# Patient Record
Sex: FEMALE | HISPANIC OR LATINO | Employment: OTHER | URBAN - METROPOLITAN AREA
[De-identification: names, ages, dates, MRNs, and addresses within clinical notes are randomized per-mention and may not be internally consistent; named-entity substitution may affect disease eponyms.]

---

## 2023-06-18 ENCOUNTER — HOSPITAL ENCOUNTER (INPATIENT)
Facility: MEDICAL CENTER | Age: 83
LOS: 4 days | DRG: 378 | End: 2023-06-22
Attending: EMERGENCY MEDICINE | Admitting: STUDENT IN AN ORGANIZED HEALTH CARE EDUCATION/TRAINING PROGRAM
Payer: MEDICARE

## 2023-06-18 DIAGNOSIS — K92.1 GASTROINTESTINAL HEMORRHAGE WITH MELENA: ICD-10-CM

## 2023-06-18 DIAGNOSIS — D64.9 ANEMIA, UNSPECIFIED TYPE: ICD-10-CM

## 2023-06-18 LAB
ABO + RH BLD: NORMAL
ABO GROUP BLD: ABNORMAL
ALBUMIN SERPL BCP-MCNC: 3.5 G/DL (ref 3.2–4.9)
ALBUMIN/GLOB SERPL: 1.5 G/DL
ALP SERPL-CCNC: 64 U/L (ref 30–99)
ALT SERPL-CCNC: 10 U/L (ref 2–50)
ANION GAP SERPL CALC-SCNC: 13 MMOL/L (ref 7–16)
APTT PPP: 24 SEC (ref 24.7–36)
AST SERPL-CCNC: 21 U/L (ref 12–45)
BARCODED ABORH UBTYP: 5100
BARCODED ABORH UBTYP: 5100
BARCODED PRD CODE UBPRD: ABNORMAL
BARCODED PRD CODE UBPRD: ABNORMAL
BARCODED UNIT NUM UBUNT: ABNORMAL
BARCODED UNIT NUM UBUNT: ABNORMAL
BASOPHILS # BLD AUTO: 0.3 % (ref 0–1.8)
BASOPHILS # BLD: 0.03 K/UL (ref 0–0.12)
BILIRUB SERPL-MCNC: 0.3 MG/DL (ref 0.1–1.5)
BLD GP AB SCN SERPL QL: ABNORMAL
BUN SERPL-MCNC: 100 MG/DL (ref 8–22)
CALCIUM ALBUM COR SERPL-MCNC: 9.4 MG/DL (ref 8.5–10.5)
CALCIUM SERPL-MCNC: 9 MG/DL (ref 8.5–10.5)
CHLORIDE SERPL-SCNC: 99 MMOL/L (ref 96–112)
CO2 SERPL-SCNC: 20 MMOL/L (ref 20–33)
COMPONENT R 8504R: ABNORMAL
COMPONENT R 8504R: ABNORMAL
CREAT SERPL-MCNC: 1.95 MG/DL (ref 0.5–1.4)
EOSINOPHIL # BLD AUTO: 0.05 K/UL (ref 0–0.51)
EOSINOPHIL NFR BLD: 0.5 % (ref 0–6.9)
ERYTHROCYTE [DISTWIDTH] IN BLOOD BY AUTOMATED COUNT: 53.1 FL (ref 35.9–50)
GFR SERPLBLD CREATININE-BSD FMLA CKD-EPI: 25 ML/MIN/1.73 M 2
GLOBULIN SER CALC-MCNC: 2.3 G/DL (ref 1.9–3.5)
GLUCOSE SERPL-MCNC: 121 MG/DL (ref 65–99)
HCT VFR BLD AUTO: 21.6 % (ref 37–47)
HGB BLD-MCNC: 7 G/DL (ref 12–16)
IMM GRANULOCYTES # BLD AUTO: 0.12 K/UL (ref 0–0.11)
IMM GRANULOCYTES NFR BLD AUTO: 1.2 % (ref 0–0.9)
INR PPP: 1.19 (ref 0.87–1.13)
LYMPHOCYTES # BLD AUTO: 1.78 K/UL (ref 1–4.8)
LYMPHOCYTES NFR BLD: 17.1 % (ref 22–41)
MCH RBC QN AUTO: 31.4 PG (ref 27–33)
MCHC RBC AUTO-ENTMCNC: 32.4 G/DL (ref 32.2–35.5)
MCV RBC AUTO: 96.9 FL (ref 81.4–97.8)
MONOCYTES # BLD AUTO: 0.78 K/UL (ref 0–0.85)
MONOCYTES NFR BLD AUTO: 7.5 % (ref 0–13.4)
NEUTROPHILS # BLD AUTO: 7.66 K/UL (ref 1.82–7.42)
NEUTROPHILS NFR BLD: 73.4 % (ref 44–72)
NRBC # BLD AUTO: 0.05 K/UL
NRBC BLD-RTO: 0.5 /100 WBC (ref 0–0.2)
PLATELET # BLD AUTO: 184 K/UL (ref 164–446)
PMV BLD AUTO: 10.3 FL (ref 9–12.9)
POTASSIUM SERPL-SCNC: 4.1 MMOL/L (ref 3.6–5.5)
PRODUCT TYPE UPROD: ABNORMAL
PRODUCT TYPE UPROD: ABNORMAL
PROT SERPL-MCNC: 5.8 G/DL (ref 6–8.2)
PROTHROMBIN TIME: 14.9 SEC (ref 12–14.6)
RBC # BLD AUTO: 2.23 M/UL (ref 4.2–5.4)
RH BLD: ABNORMAL
SODIUM SERPL-SCNC: 132 MMOL/L (ref 135–145)
UNIT STATUS USTAT: ABNORMAL
UNIT STATUS USTAT: ABNORMAL
WBC # BLD AUTO: 10.4 K/UL (ref 4.8–10.8)

## 2023-06-18 PROCEDURE — 85730 THROMBOPLASTIN TIME PARTIAL: CPT

## 2023-06-18 PROCEDURE — 99223 1ST HOSP IP/OBS HIGH 75: CPT | Mod: AI | Performed by: STUDENT IN AN ORGANIZED HEALTH CARE EDUCATION/TRAINING PROGRAM

## 2023-06-18 PROCEDURE — 86850 RBC ANTIBODY SCREEN: CPT

## 2023-06-18 PROCEDURE — 99285 EMERGENCY DEPT VISIT HI MDM: CPT

## 2023-06-18 PROCEDURE — 770020 HCHG ROOM/CARE - TELE (206)

## 2023-06-18 PROCEDURE — 86901 BLOOD TYPING SEROLOGIC RH(D): CPT

## 2023-06-18 PROCEDURE — 85610 PROTHROMBIN TIME: CPT

## 2023-06-18 PROCEDURE — 80053 COMPREHEN METABOLIC PANEL: CPT

## 2023-06-18 PROCEDURE — 93005 ELECTROCARDIOGRAM TRACING: CPT | Performed by: STUDENT IN AN ORGANIZED HEALTH CARE EDUCATION/TRAINING PROGRAM

## 2023-06-18 PROCEDURE — 36415 COLL VENOUS BLD VENIPUNCTURE: CPT

## 2023-06-18 PROCEDURE — 700105 HCHG RX REV CODE 258: Performed by: STUDENT IN AN ORGANIZED HEALTH CARE EDUCATION/TRAINING PROGRAM

## 2023-06-18 PROCEDURE — 85025 COMPLETE CBC W/AUTO DIFF WBC: CPT

## 2023-06-18 PROCEDURE — 86900 BLOOD TYPING SEROLOGIC ABO: CPT

## 2023-06-18 RX ORDER — ONDANSETRON 2 MG/ML
4 INJECTION INTRAMUSCULAR; INTRAVENOUS EVERY 4 HOURS PRN
Status: DISCONTINUED | OUTPATIENT
Start: 2023-06-18 | End: 2023-06-22 | Stop reason: HOSPADM

## 2023-06-18 RX ORDER — ASPIRIN 81 MG/1
81 TABLET ORAL DAILY
Status: ON HOLD | COMMUNITY
End: 2023-06-22

## 2023-06-18 RX ORDER — LABETALOL HYDROCHLORIDE 5 MG/ML
10 INJECTION, SOLUTION INTRAVENOUS EVERY 4 HOURS PRN
Status: DISCONTINUED | OUTPATIENT
Start: 2023-06-18 | End: 2023-06-22 | Stop reason: HOSPADM

## 2023-06-18 RX ORDER — SODIUM CHLORIDE 9 MG/ML
INJECTION, SOLUTION INTRAVENOUS CONTINUOUS
Status: DISCONTINUED | OUTPATIENT
Start: 2023-06-18 | End: 2023-06-22

## 2023-06-18 RX ORDER — ACETAMINOPHEN 325 MG/1
650 TABLET ORAL EVERY 6 HOURS PRN
Status: DISCONTINUED | OUTPATIENT
Start: 2023-06-18 | End: 2023-06-22 | Stop reason: HOSPADM

## 2023-06-18 RX ORDER — AMOXICILLIN 250 MG
2 CAPSULE ORAL 2 TIMES DAILY
Status: DISCONTINUED | OUTPATIENT
Start: 2023-06-18 | End: 2023-06-22 | Stop reason: HOSPADM

## 2023-06-18 RX ORDER — PAROXETINE 10 MG/1
10 TABLET, FILM COATED ORAL DAILY
Status: ON HOLD | COMMUNITY
End: 2023-06-22

## 2023-06-18 RX ORDER — BISACODYL 10 MG
10 SUPPOSITORY, RECTAL RECTAL
Status: DISCONTINUED | OUTPATIENT
Start: 2023-06-18 | End: 2023-06-22 | Stop reason: HOSPADM

## 2023-06-18 RX ORDER — GUAIFENESIN/DEXTROMETHORPHAN 100-10MG/5
10 SYRUP ORAL EVERY 6 HOURS PRN
Status: DISCONTINUED | OUTPATIENT
Start: 2023-06-18 | End: 2023-06-22 | Stop reason: HOSPADM

## 2023-06-18 RX ORDER — SITAGLIPTIN AND METFORMIN HYDROCHLORIDE 500; 50 MG/1; MG/1
1 TABLET, FILM COATED ORAL
COMMUNITY

## 2023-06-18 RX ORDER — ONDANSETRON 4 MG/1
4 TABLET, ORALLY DISINTEGRATING ORAL EVERY 4 HOURS PRN
Status: DISCONTINUED | OUTPATIENT
Start: 2023-06-18 | End: 2023-06-22 | Stop reason: HOSPADM

## 2023-06-18 RX ORDER — POLYETHYLENE GLYCOL 3350 17 G/17G
1 POWDER, FOR SOLUTION ORAL
Status: DISCONTINUED | OUTPATIENT
Start: 2023-06-18 | End: 2023-06-22 | Stop reason: HOSPADM

## 2023-06-18 RX ORDER — PNV NO.95/FERROUS FUM/FOLIC AC 28MG-0.8MG
1 TABLET ORAL DAILY
COMMUNITY

## 2023-06-18 RX ORDER — SODIUM CHLORIDE, SODIUM LACTATE, POTASSIUM CHLORIDE, AND CALCIUM CHLORIDE .6; .31; .03; .02 G/100ML; G/100ML; G/100ML; G/100ML
500 INJECTION, SOLUTION INTRAVENOUS
Status: DISCONTINUED | OUTPATIENT
Start: 2023-06-18 | End: 2023-06-22 | Stop reason: HOSPADM

## 2023-06-18 RX ORDER — PANTOPRAZOLE SODIUM 40 MG/10ML
40 INJECTION, POWDER, LYOPHILIZED, FOR SOLUTION INTRAVENOUS 2 TIMES DAILY
Status: DISCONTINUED | OUTPATIENT
Start: 2023-06-19 | End: 2023-06-21

## 2023-06-18 RX ORDER — ATORVASTATIN CALCIUM 10 MG/1
10 TABLET, FILM COATED ORAL NIGHTLY
COMMUNITY

## 2023-06-18 RX ORDER — ALLOPURINOL 300 MG/1
300 TABLET ORAL DAILY
COMMUNITY

## 2023-06-18 ASSESSMENT — PATIENT HEALTH QUESTIONNAIRE - PHQ9
1. LITTLE INTEREST OR PLEASURE IN DOING THINGS: NOT AT ALL
SUM OF ALL RESPONSES TO PHQ9 QUESTIONS 1 AND 2: 0
2. FEELING DOWN, DEPRESSED, IRRITABLE, OR HOPELESS: NOT AT ALL

## 2023-06-18 ASSESSMENT — LIFESTYLE VARIABLES
CONSUMPTION TOTAL: NEGATIVE
TOTAL SCORE: 0
ON A TYPICAL DAY WHEN YOU DRINK ALCOHOL HOW MANY DRINKS DO YOU HAVE: 0
EVER FELT BAD OR GUILTY ABOUT YOUR DRINKING: NO
TOTAL SCORE: 0
EVER HAD A DRINK FIRST THING IN THE MORNING TO STEADY YOUR NERVES TO GET RID OF A HANGOVER: NO
AVERAGE NUMBER OF DAYS PER WEEK YOU HAVE A DRINK CONTAINING ALCOHOL: 0
HAVE PEOPLE ANNOYED YOU BY CRITICIZING YOUR DRINKING: NO
ALCOHOL_USE: NO
TOTAL SCORE: 0
HOW MANY TIMES IN THE PAST YEAR HAVE YOU HAD 5 OR MORE DRINKS IN A DAY: 0
DOES PATIENT WANT TO STOP DRINKING: NO
HAVE YOU EVER FELT YOU SHOULD CUT DOWN ON YOUR DRINKING: NO

## 2023-06-18 ASSESSMENT — COGNITIVE AND FUNCTIONAL STATUS - GENERAL
STANDING UP FROM CHAIR USING ARMS: A LITTLE
HELP NEEDED FOR BATHING: A LITTLE
MOVING TO AND FROM BED TO CHAIR: A LITTLE
CLIMB 3 TO 5 STEPS WITH RAILING: A LITTLE
TURNING FROM BACK TO SIDE WHILE IN FLAT BAD: A LITTLE
DRESSING REGULAR UPPER BODY CLOTHING: A LITTLE
SUGGESTED CMS G CODE MODIFIER DAILY ACTIVITY: CK
DRESSING REGULAR LOWER BODY CLOTHING: A LITTLE
EATING MEALS: A LITTLE
MOVING FROM LYING ON BACK TO SITTING ON SIDE OF FLAT BED: A LITTLE
SUGGESTED CMS G CODE MODIFIER MOBILITY: CK
PERSONAL GROOMING: A LITTLE
WALKING IN HOSPITAL ROOM: A LITTLE
DAILY ACTIVITIY SCORE: 18
MOBILITY SCORE: 18
TOILETING: A LITTLE

## 2023-06-18 ASSESSMENT — FIBROSIS 4 INDEX
FIB4 SCORE: 2.96
FIB4 SCORE: 2.96

## 2023-06-19 LAB
ALBUMIN SERPL BCP-MCNC: 3.1 G/DL (ref 3.2–4.9)
ALBUMIN/GLOB SERPL: 1.4 G/DL
ALP SERPL-CCNC: 56 U/L (ref 30–99)
ALT SERPL-CCNC: 9 U/L (ref 2–50)
ANION GAP SERPL CALC-SCNC: 13 MMOL/L (ref 7–16)
AST SERPL-CCNC: 20 U/L (ref 12–45)
BASOPHILS # BLD AUTO: 0.2 % (ref 0–1.8)
BASOPHILS # BLD: 0.02 K/UL (ref 0–0.12)
BILIRUB SERPL-MCNC: 0.2 MG/DL (ref 0.1–1.5)
BUN SERPL-MCNC: 98 MG/DL (ref 8–22)
CALCIUM ALBUM COR SERPL-MCNC: 9.6 MG/DL (ref 8.5–10.5)
CALCIUM SERPL-MCNC: 8.9 MG/DL (ref 8.5–10.5)
CFT BLD TEG: 2.5 MIN (ref 4.6–9.1)
CFT P HPASE BLD TEG: 2.4 MIN (ref 4.3–8.3)
CHLORIDE SERPL-SCNC: 104 MMOL/L (ref 96–112)
CLOT ANGLE BLD TEG: 76.9 DEGREES (ref 63–78)
CLOT LYSIS 30M P MA LENFR BLD TEG: 0.2 % (ref 0–2.6)
CO2 SERPL-SCNC: 20 MMOL/L (ref 20–33)
CREAT SERPL-MCNC: 1.85 MG/DL (ref 0.5–1.4)
CRP SERPL HS-MCNC: <0.3 MG/DL (ref 0–0.75)
CT.EXTRINSIC BLD ROTEM: 0.8 MIN (ref 0.8–2.1)
EKG IMPRESSION: NORMAL
EOSINOPHIL # BLD AUTO: 0.09 K/UL (ref 0–0.51)
EOSINOPHIL NFR BLD: 0.9 % (ref 0–6.9)
ERYTHROCYTE [DISTWIDTH] IN BLOOD BY AUTOMATED COUNT: 50.6 FL (ref 35.9–50)
ERYTHROCYTE [SEDIMENTATION RATE] IN BLOOD BY WESTERGREN METHOD: 26 MM/HOUR (ref 0–25)
FERRITIN SERPL-MCNC: 37.3 NG/ML (ref 10–291)
FOLATE SERPL-MCNC: 35.4 NG/ML
GFR SERPLBLD CREATININE-BSD FMLA CKD-EPI: 27 ML/MIN/1.73 M 2
GLOBULIN SER CALC-MCNC: 2.2 G/DL (ref 1.9–3.5)
GLUCOSE BLD STRIP.AUTO-MCNC: 101 MG/DL (ref 65–99)
GLUCOSE BLD STRIP.AUTO-MCNC: 111 MG/DL (ref 65–99)
GLUCOSE BLD STRIP.AUTO-MCNC: 124 MG/DL (ref 65–99)
GLUCOSE SERPL-MCNC: 118 MG/DL (ref 65–99)
HCT VFR BLD AUTO: 21.1 % (ref 37–47)
HCT VFR BLD AUTO: 21.3 % (ref 37–47)
HCT VFR BLD AUTO: 21.5 % (ref 37–47)
HGB BLD-MCNC: 6.9 G/DL (ref 12–16)
HGB BLD-MCNC: 7.1 G/DL (ref 12–16)
HGB BLD-MCNC: 7.2 G/DL (ref 12–16)
HGB RETIC QN AUTO: 35.8 PG/CELL (ref 29–35)
IMM GRANULOCYTES # BLD AUTO: 0.12 K/UL (ref 0–0.11)
IMM GRANULOCYTES NFR BLD AUTO: 1.2 % (ref 0–0.9)
IMM RETICS NFR: 44.5 % (ref 2.6–16.1)
IRON SATN MFR SERPL: 22 % (ref 15–55)
IRON SERPL-MCNC: 56 UG/DL (ref 40–170)
LDH SERPL L TO P-CCNC: 223 U/L (ref 107–266)
LYMPHOCYTES # BLD AUTO: 1.5 K/UL (ref 1–4.8)
LYMPHOCYTES NFR BLD: 14.6 % (ref 22–41)
MAGNESIUM SERPL-MCNC: 1.9 MG/DL (ref 1.5–2.5)
MCF BLD TEG: 65 MM (ref 52–69)
MCF.PLATELET INHIB BLD ROTEM: 25.4 MM (ref 15–32)
MCH RBC QN AUTO: 31.9 PG (ref 27–33)
MCHC RBC AUTO-ENTMCNC: 33.8 G/DL (ref 32.2–35.5)
MCV RBC AUTO: 94.2 FL (ref 81.4–97.8)
MONOCYTES # BLD AUTO: 1.07 K/UL (ref 0–0.85)
MONOCYTES NFR BLD AUTO: 10.4 % (ref 0–13.4)
NEUTROPHILS # BLD AUTO: 7.44 K/UL (ref 1.82–7.42)
NEUTROPHILS NFR BLD: 72.7 % (ref 44–72)
NRBC # BLD AUTO: 0.05 K/UL
NRBC BLD-RTO: 0.5 /100 WBC (ref 0–0.2)
OSMOLALITY SERPL: 314 MOSM/KG H2O (ref 278–298)
PHOSPHATE SERPL-MCNC: 3.4 MG/DL (ref 2.5–4.5)
PLATELET # BLD AUTO: 157 K/UL (ref 164–446)
PMV BLD AUTO: 10 FL (ref 9–12.9)
POTASSIUM SERPL-SCNC: 4.3 MMOL/L (ref 3.6–5.5)
PROCALCITONIN SERPL-MCNC: 0.18 NG/ML
PROT SERPL-MCNC: 5.3 G/DL (ref 6–8.2)
RBC # BLD AUTO: 2.26 M/UL (ref 4.2–5.4)
RETICS # AUTO: 0.15 M/UL (ref 0.04–0.12)
RETICS/RBC NFR: 6.6 % (ref 0.8–2.6)
SODIUM SERPL-SCNC: 137 MMOL/L (ref 135–145)
TEG ALGORITHM TGALG: ABNORMAL
TIBC SERPL-MCNC: 255 UG/DL (ref 250–450)
UIBC SERPL-MCNC: 199 UG/DL (ref 110–370)
VIT B12 SERPL-MCNC: 529 PG/ML (ref 211–911)
WBC # BLD AUTO: 10.2 K/UL (ref 4.8–10.8)

## 2023-06-19 PROCEDURE — 83010 ASSAY OF HAPTOGLOBIN QUANT: CPT

## 2023-06-19 PROCEDURE — P9016 RBC LEUKOCYTES REDUCED: HCPCS | Mod: 91

## 2023-06-19 PROCEDURE — 93010 ELECTROCARDIOGRAM REPORT: CPT | Performed by: INTERNAL MEDICINE

## 2023-06-19 PROCEDURE — 36415 COLL VENOUS BLD VENIPUNCTURE: CPT

## 2023-06-19 PROCEDURE — 84100 ASSAY OF PHOSPHORUS: CPT

## 2023-06-19 PROCEDURE — 85652 RBC SED RATE AUTOMATED: CPT

## 2023-06-19 PROCEDURE — 83540 ASSAY OF IRON: CPT

## 2023-06-19 PROCEDURE — 30233N1 TRANSFUSION OF NONAUTOLOGOUS RED BLOOD CELLS INTO PERIPHERAL VEIN, PERCUTANEOUS APPROACH: ICD-10-PCS | Performed by: STUDENT IN AN ORGANIZED HEALTH CARE EDUCATION/TRAINING PROGRAM

## 2023-06-19 PROCEDURE — 82607 VITAMIN B-12: CPT

## 2023-06-19 PROCEDURE — 85025 COMPLETE CBC W/AUTO DIFF WBC: CPT

## 2023-06-19 PROCEDURE — 83735 ASSAY OF MAGNESIUM: CPT

## 2023-06-19 PROCEDURE — 82746 ASSAY OF FOLIC ACID SERUM: CPT

## 2023-06-19 PROCEDURE — 82728 ASSAY OF FERRITIN: CPT

## 2023-06-19 PROCEDURE — 83930 ASSAY OF BLOOD OSMOLALITY: CPT

## 2023-06-19 PROCEDURE — 80053 COMPREHEN METABOLIC PANEL: CPT

## 2023-06-19 PROCEDURE — 82962 GLUCOSE BLOOD TEST: CPT

## 2023-06-19 PROCEDURE — A9270 NON-COVERED ITEM OR SERVICE: HCPCS | Performed by: STUDENT IN AN ORGANIZED HEALTH CARE EDUCATION/TRAINING PROGRAM

## 2023-06-19 PROCEDURE — 85018 HEMOGLOBIN: CPT

## 2023-06-19 PROCEDURE — 700102 HCHG RX REV CODE 250 W/ 637 OVERRIDE(OP): Performed by: STUDENT IN AN ORGANIZED HEALTH CARE EDUCATION/TRAINING PROGRAM

## 2023-06-19 PROCEDURE — 85347 COAGULATION TIME ACTIVATED: CPT

## 2023-06-19 PROCEDURE — 85576 BLOOD PLATELET AGGREGATION: CPT

## 2023-06-19 PROCEDURE — 99223 1ST HOSP IP/OBS HIGH 75: CPT | Performed by: NURSE PRACTITIONER

## 2023-06-19 PROCEDURE — 84145 PROCALCITONIN (PCT): CPT

## 2023-06-19 PROCEDURE — 85384 FIBRINOGEN ACTIVITY: CPT

## 2023-06-19 PROCEDURE — 86140 C-REACTIVE PROTEIN: CPT

## 2023-06-19 PROCEDURE — 86923 COMPATIBILITY TEST ELECTRIC: CPT | Mod: 91

## 2023-06-19 PROCEDURE — 85046 RETICYTE/HGB CONCENTRATE: CPT

## 2023-06-19 PROCEDURE — 85014 HEMATOCRIT: CPT

## 2023-06-19 PROCEDURE — C9113 INJ PANTOPRAZOLE SODIUM, VIA: HCPCS | Performed by: STUDENT IN AN ORGANIZED HEALTH CARE EDUCATION/TRAINING PROGRAM

## 2023-06-19 PROCEDURE — 36430 TRANSFUSION BLD/BLD COMPNT: CPT

## 2023-06-19 PROCEDURE — 770020 HCHG ROOM/CARE - TELE (206)

## 2023-06-19 PROCEDURE — 83615 LACTATE (LD) (LDH) ENZYME: CPT

## 2023-06-19 PROCEDURE — 83550 IRON BINDING TEST: CPT

## 2023-06-19 PROCEDURE — 99232 SBSQ HOSP IP/OBS MODERATE 35: CPT | Performed by: HOSPITALIST

## 2023-06-19 PROCEDURE — 700105 HCHG RX REV CODE 258: Performed by: STUDENT IN AN ORGANIZED HEALTH CARE EDUCATION/TRAINING PROGRAM

## 2023-06-19 PROCEDURE — 700111 HCHG RX REV CODE 636 W/ 250 OVERRIDE (IP): Performed by: STUDENT IN AN ORGANIZED HEALTH CARE EDUCATION/TRAINING PROGRAM

## 2023-06-19 RX ORDER — ALLOPURINOL 300 MG/1
300 TABLET ORAL DAILY
Status: DISCONTINUED | OUTPATIENT
Start: 2023-06-19 | End: 2023-06-22 | Stop reason: HOSPADM

## 2023-06-19 RX ORDER — CHOLECALCIFEROL (VITAMIN D3) 125 MCG
1000 CAPSULE ORAL DAILY
Status: DISCONTINUED | OUTPATIENT
Start: 2023-06-19 | End: 2023-06-22 | Stop reason: HOSPADM

## 2023-06-19 RX ORDER — FERROUS GLUCONATE 324(38)MG
324 TABLET ORAL
Status: DISCONTINUED | OUTPATIENT
Start: 2023-06-19 | End: 2023-06-22 | Stop reason: HOSPADM

## 2023-06-19 RX ORDER — FOLIC ACID 1 MG/1
1 TABLET ORAL DAILY
Status: DISCONTINUED | OUTPATIENT
Start: 2023-06-19 | End: 2023-06-22 | Stop reason: HOSPADM

## 2023-06-19 RX ORDER — ATORVASTATIN CALCIUM 10 MG/1
10 TABLET, FILM COATED ORAL NIGHTLY
Status: DISCONTINUED | OUTPATIENT
Start: 2023-06-19 | End: 2023-06-22 | Stop reason: HOSPADM

## 2023-06-19 RX ADMIN — FERROUS GLUCONATE 324 MG: 324 TABLET ORAL at 09:08

## 2023-06-19 RX ADMIN — CYANOCOBALAMIN TAB 500 MCG 1000 MCG: 500 TAB at 05:46

## 2023-06-19 RX ADMIN — PANTOPRAZOLE SODIUM 40 MG: 40 INJECTION, POWDER, FOR SOLUTION INTRAVENOUS at 20:05

## 2023-06-19 RX ADMIN — ATORVASTATIN CALCIUM 10 MG: 10 TABLET, FILM COATED ORAL at 20:05

## 2023-06-19 RX ADMIN — SENNOSIDES AND DOCUSATE SODIUM 2 TABLET: 50; 8.6 TABLET ORAL at 04:38

## 2023-06-19 RX ADMIN — PANTOPRAZOLE SODIUM 40 MG: 40 INJECTION, POWDER, FOR SOLUTION INTRAVENOUS at 09:08

## 2023-06-19 RX ADMIN — SODIUM CHLORIDE: 9 INJECTION, SOLUTION INTRAVENOUS at 15:06

## 2023-06-19 RX ADMIN — FOLIC ACID 1 MG: 1 TABLET ORAL at 05:46

## 2023-06-19 RX ADMIN — ALLOPURINOL 300 MG: 300 TABLET ORAL at 06:06

## 2023-06-19 ASSESSMENT — ENCOUNTER SYMPTOMS
BLOOD IN STOOL: 1
CONSTIPATION: 0
NERVOUS/ANXIOUS: 0
NAUSEA: 0
DIZZINESS: 0
PALPITATIONS: 0
DEPRESSION: 0
FALLS: 0
HEARTBURN: 1
ABDOMINAL PAIN: 0
BLURRED VISION: 0
CARDIOVASCULAR NEGATIVE: 1
WEAKNESS: 1
WEAKNESS: 0
CHILLS: 0
CONSTITUTIONAL NEGATIVE: 1
MYALGIAS: 0
SORE THROAT: 0
BLOOD IN STOOL: 0
FOCAL WEAKNESS: 0
WEIGHT LOSS: 0
EYES NEGATIVE: 1
DIAPHORESIS: 0
FLANK PAIN: 0
COUGH: 0
NEUROLOGICAL NEGATIVE: 1
ABDOMINAL PAIN: 1
DIARRHEA: 0
DOUBLE VISION: 0
FEVER: 0
SHORTNESS OF BREATH: 0
VOMITING: 0
MUSCULOSKELETAL NEGATIVE: 1
PSYCHIATRIC NEGATIVE: 1
BRUISES/BLEEDS EASILY: 0
RESPIRATORY NEGATIVE: 1
HEADACHES: 0

## 2023-06-19 ASSESSMENT — LIFESTYLE VARIABLES: SUBSTANCE_ABUSE: 0

## 2023-06-19 ASSESSMENT — FIBROSIS 4 INDEX: FIB4 SCORE: 3.48

## 2023-06-19 ASSESSMENT — PATIENT HEALTH QUESTIONNAIRE - PHQ9
1. LITTLE INTEREST OR PLEASURE IN DOING THINGS: NOT AT ALL
1. LITTLE INTEREST OR PLEASURE IN DOING THINGS: NOT AT ALL
2. FEELING DOWN, DEPRESSED, IRRITABLE, OR HOPELESS: NOT AT ALL
SUM OF ALL RESPONSES TO PHQ9 QUESTIONS 1 AND 2: 0
SUM OF ALL RESPONSES TO PHQ9 QUESTIONS 1 AND 2: 0
2. FEELING DOWN, DEPRESSED, IRRITABLE, OR HOPELESS: NOT AT ALL

## 2023-06-19 NOTE — ED TRIAGE NOTES
"Chief Complaint   Patient presents with    Bloody Stools     X 7-8 days    BIBA from [facility] for blood in stools. Pt was diagnosed with Upper GI bleed and anemia. Pt was transfused with 1 unit PRBC.    Changed into gown. Hooked to monitor. Labs drawn.     /60   Pulse (!) 111   Temp 36.2 °C (97.2 °F) (Temporal)   Resp (!) 21   Ht 1.397 m (4' 7\")   Wt 79.6 kg (175 lb 7.8 oz)   SpO2 97%   BMI 40.79 kg/m²     "

## 2023-06-19 NOTE — ED PROVIDER NOTES
ED Provider Note    CHIEF COMPLAINT  Chief Complaint   Patient presents with    Bloody Stools     X 7-8 days       EXTERNAL RECORDS REVIEWED  Inpatient Notes incline records reviewed    HPI/ROS  LIMITATION TO HISTORY   Select: Language Cuban,  Used   OUTSIDE HISTORIAN(S):  None    Pat Cottrell is a 83 y.o. female who presents to the ER as a transfer from [facility] for GI bleed.  Reportedly having had dark black stools for 1 week.  Initial hemoglobin of 5.5 at that facility.  Also evidence of new NATALIO with a creatinine of 2.13.  White count of 10.3.  INR 1.2.  Patient transfused with 1 unit of PRBCs and also provided with Protonix.    No significant change with EMS transfer to this facility.  Additional history via .  Patient confirms history of diabetes and known renal disease.  No blood thinners.  Denies any significant dyspnea but feels that she is been feeling relatively weak of recent.  Denies prior history of GI bleed.    Denies any chest pain or abdominal pain.    PAST MEDICAL HISTORY   has a past medical history of Anesthesia, Arthritis, Dental disorder, Hyperlipidemia, Hypertension, Pain, Psychiatric problem, and Type II or unspecified type diabetes mellitus without mention of complication, not stated as uncontrolled.    SURGICAL HISTORY   has a past surgical history that includes knee arthroplasty total (9/4/2014).    FAMILY HISTORY  No family history on file.    SOCIAL HISTORY  Social History     Tobacco Use    Smoking status: Never    Smokeless tobacco: Not on file   Substance and Sexual Activity    Alcohol use: No    Drug use: No    Sexual activity: Not on file       CURRENT MEDICATIONS  Home Medications       Reviewed by Francisco Pete (Pharmacy Tech) on 06/18/23 at 2312  Med List Status: Complete     Medication Last Dose Status   allopurinol (ZYLOPRIM) 300 MG Tab 6/18/2023 Active   aspirin 81 MG EC tablet 6/18/2023 Active   atorvastatin  "(LIPITOR) 10 MG Tab 6/17/2023 Active   enalapril (VASOTEC) 10 MG TABS 6/18/2023 Active   PARoxetine (PAXIL) 10 MG Tab 6/18/2023 Active   Prenatal Vit-Fe Fumarate-FA (PRENATAL VITAMIN) 27-0.8 MG Tab 6/18/2023 Active   sitagliptan-metformin (JANUMET)  MG per tablet 6/18/2023 Active                    ALLERGIES  Allergies   Allergen Reactions    Pcn [Penicillins] Swelling     THROAT SWELLS    > 70 years ago   [place]       PHYSICAL EXAM  VITAL SIGNS: /62   Pulse (!) 101   Temp 36.9 °C (98.4 °F)   Resp 16   Ht 1.448 m (4' 9\")   Wt 74.4 kg (164 lb 0.4 oz)   SpO2 96%   BMI 35.49 kg/m²      Pulse ox interpretation: I interpret this pulse ox as normal.  Constitutional: Alert in no apparent distress.  HENT: No signs of trauma, Bilateral external ears normal, Nose normal.   Eyes: Pupils are equal and reactive  Neck: Normal range of motion, No tenderness, Supple  Cardiovascular: Regular rate and rhythm, no murmurs.   Thorax & Lungs: Normal breath sounds, No respiratory distress, No wheezing, No chest tenderness.   Abdomen: Bowel sounds normal, Soft, No tenderness  Skin: Warm, Dry, No erythema, No rash.   Extremities: Intact distal pulses   Musculoskeletal: Good range of motion in all major joints. No tenderness to palpation or major deformities noted.   Neurologic: Alert , Normal motor function, Normal sensory function, No focal deficits noted.   Psychiatric: Affect normal, Judgment normal, Mood normal.         DIAGNOSTIC STUDIES / PROCEDURES    LABS  Results for orders placed or performed during the hospital encounter of 06/18/23   CBC WITH DIFFERENTIAL   Result Value Ref Range    WBC 10.4 4.8 - 10.8 K/uL    RBC 2.23 (L) 4.20 - 5.40 M/uL    Hemoglobin 7.0 (L) 12.0 - 16.0 g/dL    Hematocrit 21.6 (L) 37.0 - 47.0 %    MCV 96.9 81.4 - 97.8 fL    MCH 31.4 27.0 - 33.0 pg    MCHC 32.4 32.2 - 35.5 g/dL    RDW 53.1 (H) 35.9 - 50.0 fL    Platelet Count 184 164 - 446 K/uL    MPV 10.3 9.0 - 12.9 fL    " Neutrophils-Polys 73.40 (H) 44.00 - 72.00 %    Lymphocytes 17.10 (L) 22.00 - 41.00 %    Monocytes 7.50 0.00 - 13.40 %    Eosinophils 0.50 0.00 - 6.90 %    Basophils 0.30 0.00 - 1.80 %    Immature Granulocytes 1.20 (H) 0.00 - 0.90 %    Nucleated RBC 0.50 (H) 0.00 - 0.20 /100 WBC    Neutrophils (Absolute) 7.66 (H) 1.82 - 7.42 K/uL    Lymphs (Absolute) 1.78 1.00 - 4.80 K/uL    Monos (Absolute) 0.78 0.00 - 0.85 K/uL    Eos (Absolute) 0.05 0.00 - 0.51 K/uL    Baso (Absolute) 0.03 0.00 - 0.12 K/uL    Immature Granulocytes (abs) 0.12 (H) 0.00 - 0.11 K/uL    NRBC (Absolute) 0.05 K/uL   COMP METABOLIC PANEL   Result Value Ref Range    Sodium 132 (L) 135 - 145 mmol/L    Potassium 4.1 3.6 - 5.5 mmol/L    Chloride 99 96 - 112 mmol/L    Co2 20 20 - 33 mmol/L    Anion Gap 13.0 7.0 - 16.0    Glucose 121 (H) 65 - 99 mg/dL    Bun 100 (H) 8 - 22 mg/dL    Creatinine 1.95 (H) 0.50 - 1.40 mg/dL    Calcium 9.0 8.5 - 10.5 mg/dL    AST(SGOT) 21 12 - 45 U/L    ALT(SGPT) 10 2 - 50 U/L    Alkaline Phosphatase 64 30 - 99 U/L    Total Bilirubin 0.3 0.1 - 1.5 mg/dL    Albumin 3.5 3.2 - 4.9 g/dL    Total Protein 5.8 (L) 6.0 - 8.2 g/dL    Globulin 2.3 1.9 - 3.5 g/dL    A-G Ratio 1.5 g/dL   COD (ADULT)   Result Value Ref Range    ABO Grouping Only O     Rh Grouping Only POS (A)     Antibody Screen-Cod NEG     Component R       R7                  Red Blood Cells7    [id number]   transfused   06/19/23   00:03      Product Type Red Blood Cells LR Pheresis     Dispense Status transfused     Unit Number (Barcoded) [id number]     Product Code (Barcoded) O4364C43     Blood Type (Barcoded) 5100     Component R       R99                 Red Cells, LR       [id number]   transfused   06/19/23   18:42      Product Type R99     Dispense Status transfused     Unit Number (Barcoded) [id number]     Product Code (Barcoded) M9743N24     Blood Type (Barcoded) 5100    PROTHROMBIN TIME (INR)   Result Value Ref Range    PT 14.9 (H) 12.0 - 14.6 sec    INR  1.19 (H) 0.87 - 1.13   APTT   Result Value Ref Range    APTT 24.0 (L) 24.7 - 36.0 sec   ABO Rh Confirm   Result Value Ref Range    ABO Rh Confirm O POS    ESTIMATED GFR   Result Value Ref Range    GFR (CKD-EPI) 25 (A) >60 mL/min/1.73 m 2   CORRECTED CALCIUM   Result Value Ref Range    Correct Calcium 9.4 8.5 - 10.5 mg/dL   VITAMIN B12   Result Value Ref Range    Vitamin B12 -True Cobalamin 529 211 - 911 pg/mL   FOLATE   Result Value Ref Range    Folate -Folic Acid 35.4 >4.0 ng/mL   FERRITIN   Result Value Ref Range    Ferritin 37.3 10.0 - 291.0 ng/mL   Basic TEG   Result Value Ref Range    Reaction Time Initial-R 2.5 (L) 4.6 - 9.1 min    React Time Initial Hep 2.4 (L) 4.3 - 8.3 min    Clot Kinetics-K 0.8 0.8 - 2.1 min    Clot Angle-Angle 76.9 63.0 - 78.0 degrees    Maximum Clot Strength-MA 65.0 52.0 - 69.0 mm    TEG Functional Fibrinogen(MA) 25.4 15.0 - 32.0 mm    Lysis 30 minutes-LY30 0.2 0.0 - 2.6 %    TEG Algorithm Link Algorithm    CBC WITH DIFFERENTIAL   Result Value Ref Range    WBC 10.2 4.8 - 10.8 K/uL    RBC 2.26 (L) 4.20 - 5.40 M/uL    Hemoglobin 7.2 (L) 12.0 - 16.0 g/dL    Hematocrit 21.3 (L) 37.0 - 47.0 %    MCV 94.2 81.4 - 97.8 fL    MCH 31.9 27.0 - 33.0 pg    MCHC 33.8 32.2 - 35.5 g/dL    RDW 50.6 (H) 35.9 - 50.0 fL    Platelet Count 157 (L) 164 - 446 K/uL    MPV 10.0 9.0 - 12.9 fL    Neutrophils-Polys 72.70 (H) 44.00 - 72.00 %    Lymphocytes 14.60 (L) 22.00 - 41.00 %    Monocytes 10.40 0.00 - 13.40 %    Eosinophils 0.90 0.00 - 6.90 %    Basophils 0.20 0.00 - 1.80 %    Immature Granulocytes 1.20 (H) 0.00 - 0.90 %    Nucleated RBC 0.50 (H) 0.00 - 0.20 /100 WBC    Neutrophils (Absolute) 7.44 (H) 1.82 - 7.42 K/uL    Lymphs (Absolute) 1.50 1.00 - 4.80 K/uL    Monos (Absolute) 1.07 (H) 0.00 - 0.85 K/uL    Eos (Absolute) 0.09 0.00 - 0.51 K/uL    Baso (Absolute) 0.02 0.00 - 0.12 K/uL    Immature Granulocytes (abs) 0.12 (H) 0.00 - 0.11 K/uL    NRBC (Absolute) 0.05 K/uL   Comp Metabolic Panel   Result Value Ref  Range    Sodium 137 135 - 145 mmol/L    Potassium 4.3 3.6 - 5.5 mmol/L    Chloride 104 96 - 112 mmol/L    Co2 20 20 - 33 mmol/L    Anion Gap 13.0 7.0 - 16.0    Glucose 118 (H) 65 - 99 mg/dL    Bun 98 (H) 8 - 22 mg/dL    Creatinine 1.85 (H) 0.50 - 1.40 mg/dL    Calcium 8.9 8.5 - 10.5 mg/dL    AST(SGOT) 20 12 - 45 U/L    ALT(SGPT) 9 2 - 50 U/L    Alkaline Phosphatase 56 30 - 99 U/L    Total Bilirubin 0.2 0.1 - 1.5 mg/dL    Albumin 3.1 (L) 3.2 - 4.9 g/dL    Total Protein 5.3 (L) 6.0 - 8.2 g/dL    Globulin 2.2 1.9 - 3.5 g/dL    A-G Ratio 1.4 g/dL   MAGNESIUM   Result Value Ref Range    Magnesium 1.9 1.5 - 2.5 mg/dL   PHOSPHORUS   Result Value Ref Range    Phosphorus 3.4 2.5 - 4.5 mg/dL   CRP QUANTITIVE (NON-CARDIAC)   Result Value Ref Range    Stat C-Reactive Protein <0.30 0.00 - 0.75 mg/dL   PROCALCITONIN   Result Value Ref Range    Procalcitonin 0.18 <0.25 ng/mL   Sed Rate   Result Value Ref Range    Sed Rate Westergren 26 (H) 0 - 25 mm/hour   RETICULOCYTES COUNT   Result Value Ref Range    Reticulocyte Count 6.6 (H) 0.8 - 2.6 %    Retic, Absolute 0.15 (H) 0.04 - 0.12 M/uL    Imm. Reticulocyte Fraction 44.5 (H) 2.6 - 16.1 %    Retic Hgb Equivalent 35.8 (H) 29.0 - 35.0 pg/cell   IRON/TOTAL IRON BIND   Result Value Ref Range    Iron 56 40 - 170 ug/dL    Total Iron Binding 255 250 - 450 ug/dL    Unsat Iron Binding 199 110 - 370 ug/dL    % Saturation 22 15 - 55 %   LDH   Result Value Ref Range    LDH Total 223 107 - 266 U/L   OSMOLALITY SERUM   Result Value Ref Range    Osmolality Serum 314 (H) 278 - 298 mOsm/kg H2O   CORRECTED CALCIUM   Result Value Ref Range    Correct Calcium 9.6 8.5 - 10.5 mg/dL   ESTIMATED GFR   Result Value Ref Range    GFR (CKD-EPI) 27 (A) >60 mL/min/1.73 m 2   HEMOGLOBIN AND HEMATOCRIT   Result Value Ref Range    Hemoglobin 7.1 (L) 12.0 - 16.0 g/dL    Hematocrit 21.1 (L) 37.0 - 47.0 %   HEMOGLOBIN AND HEMATOCRIT   Result Value Ref Range    Hemoglobin 6.9 (L) 12.0 - 16.0 g/dL    Hematocrit 21.5 (L)  37.0 - 47.0 %   EKG   Result Value Ref Range    Report       [facility]    Test Date:  2023  Pt Name:    BROOKLYN PRINGLE       Department: ER  MRN:        [id number]                      Room:       T715  Gender:     Female                       Technician: CHAYITO  :        [id number]                   Requested By:LUCHO SMALL  Order #:    [id number]                    Reading MD: Edis Glover MD    Measurements  Intervals                                Axis  Rate:       98                           P:          50  OK:         180                          QRS:        51  QRSD:       102                          T:          77  QT:         388  QTc:        496    Interpretive Statements  Sinus rhythm  Probable LVH with secondary repol abnrm  Borderline prolonged QT interval  No previous ECG available for comparison  Electronically Signed On 2023 18:29:28 PDT by Edis Glover MD     POCT glucose device results   Result Value Ref Range    POC Glucose, Blood 111 (H) 65 - 99 mg/dL   POCT glucose device results   Result Value Ref Range    POC Glucose, Blood 101 (H) 65 - 99 mg/dL           COURSE & MEDICAL DECISION MAKING    ED Observation Status? No; Patient does not meet criteria for ED Observation.     INITIAL ASSESSMENT, COURSE AND PLAN  Care Narrative: 83-year-old female presenting to the emergency room as a transfer from [facility] for anemia and acute GI bleed.  We will repeat hematology work-up specifically for H&H and will then proceed with ongoing inpatient care for further GI evaluation and treatment planning.    DISPOSITION AND DISCUSSIONS  I have discussed management of the patient with the following physicians and ADITYA's: Hospitalist    Discussion of management with other QHP or appropriate source(s): Pharmacy for medication verification      83-year-old female presenting the emergency department with the above presentation.  H&H has improved after her single unit of PRBCs as  transfused prior to arrival.  She is hemodynamically stable.  Hospitalist agreeable to ongoing inpatient care.  GI can be consulted tomorrow.    FINAL DIAGNOSIS  1. Gastrointestinal hemorrhage with melena    2. Anemia, unspecified type           Electronically signed by: Julinao Mead M.D., 6/18/2023 9:29 PM

## 2023-06-19 NOTE — PROGRESS NOTES
Telemetry Shift Summary     Rhythm:ST  HR: 101-105  Ectopy:  r pvcs     Measurements: 0.18/0.08/0.37                Normal values:   Rhythm: SR  HR:   Measurements: 0.12-0.20/0.08-0.10/0.30-0.52

## 2023-06-19 NOTE — ASSESSMENT & PLAN NOTE
Melena  H/h increased from 7 to 7.2, no melena overnight  Will continue PPI and octreotide  Will continue serial h/h and will transfuse prbc if hbg less 7   Avoid NSAID product  Mild tachycardia but bp stable, following closely, nursing asked to place two large bore iv  Discussed with GI, they will see  6/20/2023:  Patient with evidence of active bleeding in 3rd-4th portion of duodenum this was treated with 2 clips.  Patient will continue on IV PPI twice daily for total of 72 hours subsequent transition to oral PPI therapy twice daily thereafter for at least 6 to 8 weeks GI recommendations appreciated continue on liquid diet at present.

## 2023-06-19 NOTE — PROGRESS NOTES
4 Eyes Skin Assessment Completed by RUPAL dozier and RUPAL andrew.    Head WDL  Ears WDL  Nose WDL  Mouth WDL  Neck WDL  Breast/Chest WDL  Shoulder Blades WDL  Spine WDL  (R) Arm/Elbow/Hand Bruising  (L) Arm/Elbow/Hand Bruising  Abdomen WDL  Groin WDL  Scrotum/Coccyx/Buttocks WDL  (R) Leg WDL  (L) Leg WDL  (R) Heel/Foot/Toe WDL  (L) Heel/Foot/Toe WDL          Devices In Places Tele Box      Interventions In Place N/A    Possible Skin Injury No    Pictures Uploaded Into Epic N/A  Wound Consult Placed N/A  RN Wound Prevention Protocol Ordered No

## 2023-06-19 NOTE — ASSESSMENT & PLAN NOTE
Due to GI bleed  2 unit PRBC transfused on admit, see above, will continue to follow and continue to transfuse if neded  GI to eval   Transfuse for hemoglobin less than 7 or hemodynamic instability

## 2023-06-19 NOTE — H&P
[facility] Medicine History & Physical Note    Date of Service  6/18/2023    Primary Care Physician  Leonor Landeros M.D.    Consultants  None    Code Status  Full Code    Chief Complaint  Chief Complaint   Patient presents with    Bloody Stools     X 7-8 days       History of Presenting Illness  Pat Cottrell is a 83 y.o. female with multiple comorbidities who presented 6/18/2023 with evaluation for bloody stool. History obtained from patient's son who was at bedside in ER as patient is Chinese-speaking only.  It was reported that patient has been having intermittent bloody stool for the past 8 days.    Work-up from [facility] included:  CBC: WBC 10.3, hemoglobin 5.5, hematocrit 16.5, platelet 191  PT 15.1  INR 1.2  CMP: Sodium 133, potassium 4.1, chloride 101, bicarb 21, calcium 8.9, , creatinine 2.13, glucose 141, total protein 5.9, albumin 3.2, total bilirubin 0.3, alkaline phosphatase 53, AST 20, ALT 13    Patient was given Protonix 80 mg IV and 1 unit PRBC.  Subsequently transferred to [facility] for higher level care and GI evaluation.  Repeat hemoglobin is 7.0.  No further bleeding seen since [facility] arrival.  Admitted to medicine service for further evaluation and treatment.    I discussed the plan of care with patient, family, and bedside RN.    Review of Systems  Review of Systems   Constitutional:  Positive for malaise/fatigue. Negative for chills and fever.   HENT:  Negative for hearing loss and tinnitus.    Eyes:  Negative for blurred vision and double vision.   Respiratory:  Negative for cough and shortness of breath.    Cardiovascular:  Negative for chest pain and palpitations.   Gastrointestinal:  Positive for blood in stool and heartburn. Negative for abdominal pain, nausea and vomiting.   Genitourinary:  Negative for dysuria and hematuria.   Musculoskeletal:  Negative for joint pain and myalgias.   Skin:  Negative for itching and rash.   Neurological:  Positive for  weakness. Negative for dizziness and headaches.   Endo/Heme/Allergies:  Does not bruise/bleed easily.   Psychiatric/Behavioral:  Negative for depression and substance abuse.    All other systems reviewed and are negative.      Past Medical History   has a past medical history of Anesthesia, Arthritis, Dental disorder, Hyperlipidemia, Hypertension, Pain, Psychiatric problem, and Type II or unspecified type diabetes mellitus without mention of complication, not stated as uncontrolled.    Surgical History   has a past surgical history that includes knee arthroplasty total (9/4/2014).     Family History  family history is not on file.   Family history reviewed with patient. There is no family history that is pertinent to the chief complaint.     Social History   reports that she has never smoked. She does not have any smokeless tobacco history on file. She reports that she does not drink alcohol and does not use drugs.    Allergies  Allergies   Allergen Reactions    Pcn [Penicillins] Swelling     THROAT SWELLS    > 70 years ago   In [place]       Medications  Prior to Admission Medications   Prescriptions Last Dose Informant Patient Reported? Taking?   alprazolam (XANAX) 0.25 MG TABS   No No   Sig: Take 1 Tab by mouth 3 times a day as needed for Anxiety.   alprazolam (XANAX) 0.5 MG TABS   Yes No   Sig: Take 0.5 mg by mouth at bedtime as needed. Indications: Feeling Anxious   aspirin EC (ECOTRIN) 81 MG TBEC   Yes No   Sig: Take 81 mg by mouth every day.   atorvastatin (LIPITOR) 20 MG TABS   Yes No   Sig: Take 40 mg by mouth every evening.   chlorprpamide (DIABINESE) 250 MG TABS  Family Member Yes No   Sig: Take 125 mg by mouth every morning with breakfast. Note dosage 1/2 tablet   Indications: Secondary Diabetes   chlorprpamide (DIABINESE) 250 MG TABS   Yes No   Sig: Take 250 mg by mouth every morning with breakfast.   docusate sodium (COLACE) 100 MG CAPS   Yes No   Sig: Take 100 mg by mouth every day.   enalapril  (VASOTEC) 10 MG TABS   Yes No   Sig: Take 10 mg by mouth every day. Indications: High Blood Pressure   enalapril (VASOTEC) 10 MG TABS   Yes No   Sig: Take 10 mg by mouth every day.   ferrous sulfate 325 (65 FE) MG tablet   No No   Sig: Take 1 Tab by mouth 2 times a day, with meals.   ferrous sulfate 325 (65 FE) MG tablet   Yes No   Sig: Take 325 mg by mouth every day.   fluoxetine (PROZAC) 20 MG CAPS   Yes No   Sig: Take 20 mg by mouth every day.   hydrocodone/acetaminophen (NORCO)  MG TABS   Yes No   Sig: Take 1-2 Tabs by mouth every four hours as needed. Indications: Moderate to Moderately Severe Pain   hyoscyamine-maalox plus-lidocaine viscous (GI COCKTAIL)   No No   Sig: Take 15 mL by mouth every 6 hours as needed.   omeprazole (PRILOSEC) 20 MG delayed-release capsule   No No   Sig: Take 2 Caps by mouth every day.   tramadol (ULTRAM) 50 MG TABS   Yes No   Sig: Take  mg by mouth at bedtime as needed.      Facility-Administered Medications: None       Physical Exam  Temp:  [36.2 °C (97.2 °F)-37.1 °C (98.8 °F)] 36.7 °C (98.1 °F)  Pulse:  [] 98  Resp:  [14-21] 18  BP: ()/(55-65) 106/61  SpO2:  [92 %-100 %] 92 %  Blood Pressure : 118/55   Temperature: 36.2 °C (97.2 °F)   Pulse: 87   Respiration: 17   Pulse Oximetry: 93 %       Physical Exam  Vitals and nursing note reviewed.   Constitutional:       Appearance: She is obese.   HENT:      Head: Normocephalic and atraumatic.      Nose: Nose normal.      Mouth/Throat:      Mouth: Mucous membranes are dry.      Pharynx: Oropharynx is clear.   Eyes:      General: No scleral icterus.     Extraocular Movements: Extraocular movements intact.   Cardiovascular:      Rate and Rhythm: Normal rate and regular rhythm.      Pulses: Normal pulses.      Heart sounds:      No friction rub.   Pulmonary:      Effort: No respiratory distress.      Breath sounds: No wheezing or rales.   Chest:      Chest wall: No tenderness.   Abdominal:      General: There is  distension.      Tenderness: There is no abdominal tenderness. There is no guarding or rebound.   Musculoskeletal:         General: No tenderness. Normal range of motion.      Cervical back: Neck supple. No tenderness.      Right lower leg: No edema.      Left lower leg: No edema.   Skin:     General: Skin is warm and dry.      Capillary Refill: Capillary refill takes less than 2 seconds.   Neurological:      General: No focal deficit present.      Mental Status: She is alert and oriented to person, place, and time.   Psychiatric:         Mood and Affect: Mood normal.         Laboratory:  Recent Labs     06/18/23 2053 06/19/23  0323   WBC 10.4 10.2   RBC 2.23* 2.26*   HEMOGLOBIN 7.0* 7.2*   HEMATOCRIT 21.6* 21.3*   MCV 96.9 94.2   MCH 31.4 31.9   MCHC 32.4 33.8   RDW 53.1* 50.6*   PLATELETCT 184 157*   MPV 10.3 10.0     Recent Labs     06/18/23 2053 06/19/23  0323   SODIUM 132* 137   POTASSIUM 4.1 4.3   CHLORIDE 99 104   CO2 20 20   GLUCOSE 121* 118*   * 98*   CREATININE 1.95* 1.85*   CALCIUM 9.0 8.9     Recent Labs     06/18/23 2053 06/19/23  0323   ALTSGPT 10 9   ASTSGOT 21 20   ALKPHOSPHAT 64 56   TBILIRUBIN 0.3 0.2   GLUCOSE 121* 118*     Recent Labs     06/18/23 2053   APTT 24.0*   INR 1.19*     No results for input(s): NTPROBNP in the last 72 hours.      No results for input(s): TROPONINT in the last 72 hours.    Imaging:  No orders to display       no X-Ray or EKG requiring interpretation    Assessment/Plan:  Justification for Admission Status  I anticipate this patient will require at least 2 midnights hospitalization, therefore appropriate for inpatient status.        * GI bleed- (present on admission)  Assessment & Plan  PPI  Trend H&H, transfuse as needed  Avoid NSAID product    Consult GI in a.m.    Acute blood loss anemia  Assessment & Plan  Due to GI bleed  2 unit PRBC transfused so far  Anemia work-up- replace iron/B12/folate  Trend HH  Transfuse for hemoglobin less than 7 or hemodynamic  instability      Acute kidney injury superimposed on CKD (HCC)  Assessment & Plan  NATALIO on CKD 3-4  Minimize nephrotoxic agents  Renally dose meds  Check FeNa    Hyperlipidemia  Assessment & Plan  Statin    Diabetes (HCC)  Assessment & Plan  ISS    Class 2 obesity in adult  Assessment & Plan  Diet and lifestyle modification  Body mass index is 35.49 kg/m².          VTE prophylaxis: SCDs/TEDs

## 2023-06-19 NOTE — PROGRESS NOTES
Received Pt; Pt A/O x4; Bangladeshi speaking; no complains of pain; information obtained thru her son at bedside; initial assessment done and completed;Pt oriented to her room; safety measures in placed; advised to use call light    Pt consented for blood transfusion. Latest HGB: 7.0     Pt started on blood transfusion at 0019; cross checked done with Margarita GOLDSMITH; blood compatible; V/S recorded; stayed with the pt; blood finished at 0251H; no transfusion reaction seen; no complains of pain or discomforts;

## 2023-06-19 NOTE — PROGRESS NOTES
[facility] Medicine Daily Progress Note    Date of Service  6/19/2023    Chief Complaint  Pat Cottrell is a 83 y.o. female admitted 6/18/2023 with bloody stool     Hospital Course  Patient is an 83 year old female with multiple comorbidities who presented to [facility] on 6/18/2023 for evaluation of bloody stool. History obtained from patient's son who was at bedside in ER as patient is Jordanian-speaking only.  It was reported that patient has been having intermittent dark tarry stool for the past 8 days.     Work-up from [place] ER included:  CBC: WBC 10.3, hemoglobin 5.5, hematocrit 16.5, platelet 191  PT 15.1  INR 1.2  CMP: Sodium 133, potassium 4.1, chloride 101, bicarb 21, calcium 8.9, , creatinine 2.13, glucose 141, total protein 5.9, albumin 3.2, total bilirubin 0.3, alkaline phosphatase 53, AST 20, ALT 13     Patient was given Protonix 80 mg IV and 1 unit PRBC.  Subsequently transferred to [facility] for higher level care and GI evaluation. Repeat hemoglobin is 7.0.  No further bleeding seen since [facility] ER arrival.  Admitted to medicine service for further evaluation and treatment.    Interval Problem Update  Axox3, family at bedside, no bm since admit, she denies abdominal pain, no chest pain, no dizziness. NO sob, no nausea. Mild tachycardia. ROS otherwise negative. I discussed with GI- they will come eval     I have discussed this patient's plan of care and discharge plan at IDT rounds today with Case Management, Nursing, Nursing leadership, and other members of the IDT team.    Consultants/Specialty  GI    Code Status  Full Code    Disposition    I have placed the appropriate orders for post-discharge needs.    Review of Systems  Review of Systems   Constitutional: Negative.  Negative for chills, diaphoresis, fever, malaise/fatigue and weight loss.   HENT: Negative.  Negative for sore throat.    Eyes: Negative.  Negative for blurred vision.   Respiratory: Negative.  Negative for  cough and shortness of breath.    Cardiovascular: Negative.  Negative for chest pain, palpitations and leg swelling.   Gastrointestinal:  Positive for blood in stool. Negative for abdominal pain, nausea and vomiting.   Genitourinary: Negative.  Negative for dysuria.   Musculoskeletal: Negative.  Negative for myalgias.   Skin: Negative.  Negative for itching and rash.   Neurological: Negative.  Negative for dizziness, focal weakness, weakness and headaches.   Endo/Heme/Allergies: Negative.  Does not bruise/bleed easily.   Psychiatric/Behavioral: Negative.  Negative for depression, substance abuse and suicidal ideas.    All other systems reviewed and are negative.       Physical Exam  Temp:  [36.2 °C (97.2 °F)-37.1 °C (98.8 °F)] 37 °C (98.6 °F)  Pulse:  [] 102  Resp:  [14-21] 16  BP: ()/(55-75) 123/64  SpO2:  [92 %-100 %] 94 %    Physical Exam  Vitals and nursing note reviewed. Exam conducted with a chaperone present.   Constitutional:       General: She is not in acute distress.     Appearance: Normal appearance. She is not diaphoretic.   HENT:      Head: Normocephalic.      Nose: Nose normal.      Mouth/Throat:      Mouth: Mucous membranes are moist.   Eyes:      Pupils: Pupils are equal, round, and reactive to light.   Cardiovascular:      Rate and Rhythm: Normal rate and regular rhythm.      Pulses: Normal pulses.      Heart sounds: Normal heart sounds.   Pulmonary:      Effort: Pulmonary effort is normal.      Breath sounds: Normal breath sounds.   Abdominal:      General: Abdomen is flat. Bowel sounds are normal.      Palpations: Abdomen is soft.   Musculoskeletal:         General: No swelling or deformity. Normal range of motion.   Skin:     General: Skin is warm and dry.      Capillary Refill: Capillary refill takes less than 2 seconds.   Neurological:      General: No focal deficit present.      Mental Status: She is alert and oriented to person, place, and time.      Cranial Nerves: No cranial  nerve deficit.   Psychiatric:         Mood and Affect: Mood normal.         Behavior: Behavior normal.         Fluids    Intake/Output Summary (Last 24 hours) at 6/19/2023 1428  Last data filed at 6/19/2023 0251  Gross per 24 hour   Intake 600.67 ml   Output --   Net 600.67 ml       Laboratory  Recent Labs     06/18/23 2053 06/19/23  0323 06/19/23  1132   WBC 10.4 10.2  --    RBC 2.23* 2.26*  --    HEMOGLOBIN 7.0* 7.2* 7.1*   HEMATOCRIT 21.6* 21.3* 21.1*   MCV 96.9 94.2  --    MCH 31.4 31.9  --    MCHC 32.4 33.8  --    RDW 53.1* 50.6*  --    PLATELETCT 184 157*  --    MPV 10.3 10.0  --      Recent Labs     06/18/23 2053 06/19/23 0323   SODIUM 132* 137   POTASSIUM 4.1 4.3   CHLORIDE 99 104   CO2 20 20   GLUCOSE 121* 118*   * 98*   CREATININE 1.95* 1.85*   CALCIUM 9.0 8.9     Recent Labs     06/18/23 2053   APTT 24.0*   INR 1.19*               Imaging  No orders to display        Assessment/Plan  * GI bleed- (present on admission)  Assessment & Plan    Melena  H/h increased from 7 to 7.2, no melena overnight  Will continue PPI and octreotide  Will continue serial h/h and will transfuse prbc if hbg less 7   Avoid NSAID product  Mild tachycardia but bp stable, following closely, nursing asked to place two large bore iv  Discussed with GI, they will see      Thrombocytopenia (HCC)  Assessment & Plan  Mild  following    Class 2 obesity in adult  Assessment & Plan  Diet and lifestyle modification  Body mass index is 35.49 kg/m².      Hyperlipidemia  Assessment & Plan  Statin    Diabetes (HCC)  Assessment & Plan  ISS    Acute kidney injury superimposed on CKD (HCC)  Assessment & Plan  NATALIO on CKD 3-4  Minimize nephrotoxic agents  Renally dose meds  At baseline  Will continue to follow  Repeat bmp in am    Acute blood loss anemia  Assessment & Plan  Due to GI bleed  2 unit PRBC transfused on admit, see above, will continue to follow and continue to transfuse if neded  GI to eval   Transfuse for hemoglobin less than  7 or hemodynamic instability      Hyponatremia- (present on admission)  Assessment & Plan  Resolved  following         VTE prophylaxis: SCDs/TEDs    I have performed a physical exam and reviewed and updated ROS and Plan today (6/19/2023). In review of yesterday's note (6/18/2023), there are no changes except as documented above.

## 2023-06-19 NOTE — CARE PLAN
The patient is Watcher - Medium risk of patient condition declining or worsening    Shift Goals  Clinical Goals: blood transfusion  Patient Goals: comfort/ safety    Progress made toward(s) clinical / shift goals:    Problem: Knowledge Deficit - Standard  Goal: Patient and family/care givers will demonstrate understanding of plan of care, disease process/condition, diagnostic tests and medications  Outcome: Progressing     Problem: Hemodynamics  Goal: Patient's hemodynamics, fluid balance and neurologic status will be stable or improve  Outcome: Progressing     Problem: Fluid Volume  Goal: Fluid volume balance will be maintained  Outcome: Progressing     Problem: Urinary - Renal Perfusion  Goal: Ability to achieve and maintain adequate renal perfusion and functioning will improve  Outcome: Progressing     Problem: Respiratory  Goal: Patient will achieve/maintain optimum respiratory ventilation and gas exchange  Outcome: Progressing   Problem: Physical Regulation  Goal: Diagnostic test results will improve  Outcome: Progressing  Goal: Signs and symptoms of infection will decrease  Outcome: Progressing       Patient is not progressing towards the following goals:

## 2023-06-19 NOTE — ED NOTES
Med rec updated and complete. Allergies reviewed and updated. Per interview  wit family at bedside.  Family brought   Family brought in most of the pts medications.   No antibiotic use in last 30 days.        Last ASA dose 06/18/23.      Home pharmacy  [facility] = [phone]

## 2023-06-19 NOTE — CARE PLAN
Problem: Knowledge Deficit - Standard  Goal: Patient and family/care givers will demonstrate understanding of plan of care, disease process/condition, diagnostic tests and medications  Description: Target End Date:  1-3 days or as soon as patient condition allows    Document in Patient Education    1.  Patient and family/caregiver oriented to unit, equipment, visitation policy and means for communicating concern  2.  Complete/review Learning Assessment  3.  Assess knowledge level of disease process/condition, treatment plan, diagnostic tests and medications  4.  Explain disease process/condition, treatment plan, diagnostic tests and medications  Outcome: Progressing     Problem: Fluid Volume  Goal: Fluid volume balance will be maintained  Description: Target End Date:  Prior to discharge or change in level of care    Document on I/O flowsheet    1.  Monitor intake and output as ordered  2.  Promote oral intake as appropriate  3.  Report inadequate intake or output to physician  4.  Administer IV therapy as ordered  5.  Weights per provider order  6.  Assess for signs and symptoms of bleeding  7.  Monitor for signs of fluid overload (respiratory changes, edema, weight gain, increased abdominal girth)  8.  Monitor of signs for inadequate fluid volume (poor skin turgor, dry mucous membranes)  9.  Instruct patient on adherence to fluid restrictions  Outcome: Progressing   The patient is Stable - Low risk of patient condition declining or worsening    Shift Goals  Clinical Goals: monitor VS, Maintain Hemodynamics, and H&H  Patient Goals: comfort/ safety    Progress made toward(s) clinical / shift goals: BP Stable    Patient is not progressing towards the following goals:

## 2023-06-19 NOTE — ASSESSMENT & PLAN NOTE
NATALIO on CKD 3-4  Minimize nephrotoxic agents  Renally dose meds  At baseline  Will continue to follow  Repeat bmp in am

## 2023-06-19 NOTE — CONSULTS
Date of Consultation:  6/19/2023    Patient: : Pat Cottrell  MRN: [id number]    Referring Physician:  Mitra Richey MD     GI:ÓSCAR Rehman     Reason for Consultation: GI bleeding    History of Present Illness:     Patient is Vietnamese-speaking only and history was obtained at the request of patient to use her son as an .    This is an 83-year-old female with a past medical history of hyperlipidemia, hypertension, type 2 diabetes mellitus, chronic kidney disease secondary to type 2 diabetes mellitus, gastric ulcer who presented to [facility] from [place] emergency department for GI bleeding.  Patient has had an 8-day history of epigastric discomfort and intermittent episodes of dark, tarry stools for the past 8 days.  She states pain is dull/achy, nonradiating.    She states the pain feels the same during the daytime as well as at nighttime as well as with drinking water. No relieving or exacerbating factors.  She denies nausea, vomiting, hematemesis, hematochezia, dysphagia, odynophagia, heartburn, change in appetite.  She reports having taken Tylenol for analgesia but denies any further NSAIDs.  She also denies any blood thinners.    In the emergency department, leukocytosis of 10.3, hemoglobin 5.5, hematocrit 60.5, platelets 191.  PT 15.1, INR 1.2, , creatinine 2.13, glucose 141, AST 20, ALT 13, alk phos 53, total bilirubin 0.3.  Hemoccult positive.  While [facility] emergency department, her systolic blood pressure dropped to 92 systolically if she became tachycardic at 110 bpm.  Patient was started on Protonix IV and transfuse 1 unit PRBC and was transferred to [facility] for higher level of care including GI evaluation.  Repeat hemoglobin on arrival was 7 and has remained stable 7.1-7.2.      Tobacco use: Denies  Alcohol use: Denies  Illicit drug use: Denies    Last EGD: Denies prior  Last colonoscopy: Denies  prior  NSAID/ASA use: Denies  Anticoagulation use: Denies      Past Medical History:   Diagnosis Date    Anesthesia     pt has never had surgery, speaks only British    Arthritis     Dental disorder     lower dentures    Hyperlipidemia     Hypertension     takes Co-Renitec    Pain     chronic neck pain    Psychiatric problem     anxiety    Type II or unspecified type diabetes mellitus without mention of complication, not stated as uncontrolled          Past Surgical History:   Procedure Laterality Date    KNEE ARTHROPLASTY TOTAL  9/4/2014    Performed by Goran Godwin M.D. at SURGERY [facility]       No family history on file.    Social History     Socioeconomic History    Marital status:    Tobacco Use    Smoking status: Never   Substance and Sexual Activity    Alcohol use: No    Drug use: No       Review of systems:  Review of Systems   Constitutional:  Positive for malaise/fatigue. Negative for chills and fever.   HENT:  Negative for congestion and sore throat.    Respiratory:  Negative for cough and shortness of breath.    Cardiovascular:  Negative for chest pain and leg swelling.   Gastrointestinal:  Positive for abdominal pain (Epigastric) and melena. Negative for blood in stool, constipation, diarrhea, nausea and vomiting.   Genitourinary:  Negative for dysuria and flank pain.   Musculoskeletal:  Negative for falls and myalgias.   Neurological:  Positive for weakness. Negative for headaches.   Psychiatric/Behavioral:  Negative for substance abuse. The patient is not nervous/anxious.    All other systems reviewed and are negative.        Physical Exam:  Vitals:    06/19/23 0400 06/19/23 0727 06/19/23 1054 06/19/23 1540   BP: 106/61 114/75 123/64 106/59   Pulse: 98 (!) 105 (!) 102 98   Resp: 18 17 16 18   Temp:  37.1 °C (98.8 °F) 37 °C (98.6 °F) 36.7 °C (98.1 °F)   TempSrc: Temporal Temporal Temporal Temporal   SpO2: 92% 95% 94% 94%   Weight:       Height:           Physical Exam  Vitals and  nursing note reviewed.   Constitutional:       General: She is not in acute distress.     Appearance: She is obese.   HENT:      Head: Normocephalic.      Nose: Nose normal. No congestion.      Mouth/Throat:      Mouth: Mucous membranes are dry.      Pharynx: Oropharynx is clear. No oropharyngeal exudate.   Eyes:      General: No scleral icterus.     Extraocular Movements: Extraocular movements intact.      Conjunctiva/sclera: Conjunctivae normal.   Cardiovascular:      Rate and Rhythm: Normal rate and regular rhythm.      Pulses: Normal pulses.      Heart sounds: Normal heart sounds. No murmur heard.  Pulmonary:      Effort: Pulmonary effort is normal. No respiratory distress.      Breath sounds: Normal breath sounds. No wheezing.   Chest:      Chest wall: No tenderness.   Abdominal:      General: Abdomen is flat. Bowel sounds are normal. There is no distension.      Palpations: Abdomen is soft.      Tenderness: There is abdominal tenderness (Mild epigastric with deep palpation). There is no guarding.   Musculoskeletal:      Right lower leg: No edema.      Left lower leg: No edema.   Skin:     General: Skin is warm and dry.      Capillary Refill: Capillary refill takes less than 2 seconds.      Coloration: Skin is pale.   Neurological:      General: No focal deficit present.      Mental Status: She is alert and oriented to person, place, and time. Mental status is at baseline.      Motor: No weakness.   Psychiatric:         Mood and Affect: Mood normal.         Behavior: Behavior normal.         Thought Content: Thought content normal.         Judgment: Judgment normal.           Labs:  Recent Labs     06/18/23 2053 06/19/23  0323 06/19/23  1132   WBC 10.4 10.2  --    RBC 2.23* 2.26*  --    HEMOGLOBIN 7.0* 7.2* 7.1*   HEMATOCRIT 21.6* 21.3* 21.1*   MCV 96.9 94.2  --    MCH 31.4 31.9  --    MCHC 32.4 33.8  --    RDW 53.1* 50.6*  --    PLATELETCT 184 157*  --    MPV 10.3 10.0  --      Recent Labs     06/18/23 2053  06/19/23  0323   SODIUM 132* 137   POTASSIUM 4.1 4.3   CHLORIDE 99 104   CO2 20 20   GLUCOSE 121* 118*   * 98*     Recent Labs     06/18/23 2053   APTT 24.0*   INR 1.19*       Recent Labs     06/18/23 2053 06/19/23  0323   ASTSGOT 21 20   ALTSGPT 10 9   TBILIRUBIN 0.3 0.2   ALKPHOSPHAT 64 56   GLOBULIN 2.3 2.2   INR 1.19*  --          Imaging:  CT-ABDOMEN-PELVIS WITH  Narrative: 11/8/2014 6:10 PM    HISTORY/REASON FOR EXAM:  Pain    TECHNIQUE/EXAM DESCRIPTION:  CT of the abdomen and pelvis with contrast.    Contrast-enhanced helical scanning was obtained from the diaphragmatic domes through the pubic symphysis following the bolus administration of 100 mL of nonionic contrast (Omnipaque 350) without complication.    COMPARISON: No prior studies available.    FINDINGS:  The visualized lung bases show no consolidation. There is an ovoid 5 mm right lower lobe pulmonary nodule.    There is mitral annular calcification. There is coronary artery disease. There is a small sliding hiatal hernia.    CT Abdomen:  Circumscribed hepatic and renal too small to characterize hypodensities are present which statistically most likely represent cysts. There is an exophytic simple renal lower pole 6 cm cyst    The liver, spleen, pancreas, gallbladder, adrenal glands and kidneys enhance normally.  Small mass anterior to the spleen most likely is a splenule    There is no abnormal bowel dilatation or inflammatory change. There is severe distal worse than mild proximal colonic diverticulosis. Contrast is present in the mobile cecum excluding small bowel obstruction. Appendix is normal. second portion duodenal diverticulum.    There is no lymphadenopathy or aneurysmal change of the aorta. Moderate to severe atherosclerosis    CT Pelvis:  Visualized pelvic structures are unremarkable.  No cystic ovarian mass    Small lymph nodes    Unremarkable bladder    There is no free air or free fluid.    Hyperlordosis with grade 1 L4-5  anterolisthesis, advanced facet arthropathy. Diffuse idiopathic skeletal hyperostosis  Impression: Severe colonic diverticulosis without CT evidence of diverticulitis.    Simple left renal cyst. Multiple additional renal and hepatic hypodensities are too small to characterize but statistically most likely represent cysts.    Small-medium sized hiatal hernia.    5 mm right lower lobe noncalcified pulmonary nodule. Follow-up CT is advised in 6-12 months, then at 18-24 months and if unchanged, additional follow-up is not required (Amparo et al, Radiology 2005).            Impressions:  Melena  Acute blood loss anemia  Upper GI bleeding  History of ulcer  Hypertension  Hypercholesterolemia  Type 2 diabetes mellitus  CKD stage IV    MDM:  This is a pleasant 83-year-old female with a past medical history as listed above who was transferred from [place] emergency department to [facility] for higher level of care for upper GI bleeding.  Hemoglobin at prior facility 5.5, systolic blood pressure dropped to 92, and patient became tachycardic with a heart rate of 110 bpm.  She was started on Protonix IV, transfused with 1 unit of PRBCs and transferred to [facility] for higher level of care.  Upon arrival, repeat hemoglobin 7 and has remained so throughout the day.  Discussed concerns with patient and family for upper GI bleeding as well as further evaluation and possible treatment with EGD.  She is agreeable to proceed.    Recommendations:  Monitor H/H and for signs of overt bleeding-transfuse as necessary  Continue PPI  May have diet tonight, n.p.o. at midnight.  Plan for EGD tomorrow.    Discussed with patient, family at bedside, primary team, Dr. Garcia.      This note was generated using voice recognition software which has a small chance of producing errors of grammar and possibly content. I have made every reasonable attempt to find and correct any obvious errors, but expect  that some may not be found prior to finalization of this note.

## 2023-06-20 ENCOUNTER — ANESTHESIA (OUTPATIENT)
Dept: SURGERY | Facility: MEDICAL CENTER | Age: 83
DRG: 378 | End: 2023-06-20
Payer: MEDICARE

## 2023-06-20 ENCOUNTER — ANESTHESIA EVENT (OUTPATIENT)
Dept: SURGERY | Facility: MEDICAL CENTER | Age: 83
DRG: 378 | End: 2023-06-20
Payer: MEDICARE

## 2023-06-20 LAB
ANION GAP SERPL CALC-SCNC: 8 MMOL/L (ref 7–16)
BASOPHILS # BLD AUTO: 0.4 % (ref 0–1.8)
BASOPHILS # BLD: 0.04 K/UL (ref 0–0.12)
BUN SERPL-MCNC: 72 MG/DL (ref 8–22)
CALCIUM SERPL-MCNC: 8.8 MG/DL (ref 8.5–10.5)
CHLORIDE SERPL-SCNC: 107 MMOL/L (ref 96–112)
CO2 SERPL-SCNC: 23 MMOL/L (ref 20–33)
CREAT SERPL-MCNC: 1.69 MG/DL (ref 0.5–1.4)
EOSINOPHIL # BLD AUTO: 0.18 K/UL (ref 0–0.51)
EOSINOPHIL NFR BLD: 2 % (ref 0–6.9)
ERYTHROCYTE [DISTWIDTH] IN BLOOD BY AUTOMATED COUNT: 52.4 FL (ref 35.9–50)
GFR SERPLBLD CREATININE-BSD FMLA CKD-EPI: 30 ML/MIN/1.73 M 2
GLUCOSE BLD STRIP.AUTO-MCNC: 129 MG/DL (ref 65–99)
GLUCOSE BLD STRIP.AUTO-MCNC: 132 MG/DL (ref 65–99)
GLUCOSE BLD STRIP.AUTO-MCNC: 151 MG/DL (ref 65–99)
GLUCOSE SERPL-MCNC: 145 MG/DL (ref 65–99)
HAPTOGLOB SERPL-MCNC: 37 MG/DL (ref 30–200)
HCT VFR BLD AUTO: 24.3 % (ref 37–47)
HCT VFR BLD AUTO: 24.4 % (ref 37–47)
HCT VFR BLD AUTO: 24.7 % (ref 37–47)
HGB BLD-MCNC: 7.8 G/DL (ref 12–16)
HGB BLD-MCNC: 7.9 G/DL (ref 12–16)
HGB BLD-MCNC: 8 G/DL (ref 12–16)
IMM GRANULOCYTES # BLD AUTO: 0.12 K/UL (ref 0–0.11)
IMM GRANULOCYTES NFR BLD AUTO: 1.3 % (ref 0–0.9)
LYMPHOCYTES # BLD AUTO: 1.26 K/UL (ref 1–4.8)
LYMPHOCYTES NFR BLD: 13.9 % (ref 22–41)
MCH RBC QN AUTO: 30.5 PG (ref 27–33)
MCHC RBC AUTO-ENTMCNC: 32.1 G/DL (ref 32.2–35.5)
MCV RBC AUTO: 94.9 FL (ref 81.4–97.8)
MONOCYTES # BLD AUTO: 1.11 K/UL (ref 0–0.85)
MONOCYTES NFR BLD AUTO: 12.2 % (ref 0–13.4)
NEUTROPHILS # BLD AUTO: 6.38 K/UL (ref 1.82–7.42)
NEUTROPHILS NFR BLD: 70.2 % (ref 44–72)
NRBC # BLD AUTO: 0.06 K/UL
NRBC BLD-RTO: 0.7 /100 WBC (ref 0–0.2)
PLATELET # BLD AUTO: 161 K/UL (ref 164–446)
PMV BLD AUTO: 9.9 FL (ref 9–12.9)
POTASSIUM SERPL-SCNC: 4.6 MMOL/L (ref 3.6–5.5)
RBC # BLD AUTO: 2.56 M/UL (ref 4.2–5.4)
SODIUM SERPL-SCNC: 138 MMOL/L (ref 135–145)
WBC # BLD AUTO: 9.1 K/UL (ref 4.8–10.8)

## 2023-06-20 PROCEDURE — C9113 INJ PANTOPRAZOLE SODIUM, VIA: HCPCS | Performed by: STUDENT IN AN ORGANIZED HEALTH CARE EDUCATION/TRAINING PROGRAM

## 2023-06-20 PROCEDURE — 99100 ANES PT EXTEME AGE<1 YR&>70: CPT | Performed by: ANESTHESIOLOGY

## 2023-06-20 PROCEDURE — 0W3P8ZZ CONTROL BLEEDING IN GASTROINTESTINAL TRACT, VIA NATURAL OR ARTIFICIAL OPENING ENDOSCOPIC: ICD-10-PCS | Performed by: INTERNAL MEDICINE

## 2023-06-20 PROCEDURE — 00731 ANES UPR GI NDSC PX NOS: CPT | Performed by: ANESTHESIOLOGY

## 2023-06-20 PROCEDURE — A9270 NON-COVERED ITEM OR SERVICE: HCPCS | Performed by: STUDENT IN AN ORGANIZED HEALTH CARE EDUCATION/TRAINING PROGRAM

## 2023-06-20 PROCEDURE — 700101 HCHG RX REV CODE 250: Performed by: ANESTHESIOLOGY

## 2023-06-20 PROCEDURE — 770020 HCHG ROOM/CARE - TELE (206)

## 2023-06-20 PROCEDURE — 700111 HCHG RX REV CODE 636 W/ 250 OVERRIDE (IP): Performed by: STUDENT IN AN ORGANIZED HEALTH CARE EDUCATION/TRAINING PROGRAM

## 2023-06-20 PROCEDURE — 160035 HCHG PACU - 1ST 60 MINS PHASE I: Performed by: INTERNAL MEDICINE

## 2023-06-20 PROCEDURE — 36415 COLL VENOUS BLD VENIPUNCTURE: CPT

## 2023-06-20 PROCEDURE — 85025 COMPLETE CBC W/AUTO DIFF WBC: CPT

## 2023-06-20 PROCEDURE — 160202 HCHG ENDO MINUTES - 1ST 30 MINS LEVEL 3: Performed by: INTERNAL MEDICINE

## 2023-06-20 PROCEDURE — 160009 HCHG ANES TIME/MIN: Performed by: INTERNAL MEDICINE

## 2023-06-20 PROCEDURE — 160048 HCHG OR STATISTICAL LEVEL 1-5: Performed by: INTERNAL MEDICINE

## 2023-06-20 PROCEDURE — 700105 HCHG RX REV CODE 258: Performed by: STUDENT IN AN ORGANIZED HEALTH CARE EDUCATION/TRAINING PROGRAM

## 2023-06-20 PROCEDURE — 80048 BASIC METABOLIC PNL TOTAL CA: CPT

## 2023-06-20 PROCEDURE — 43255 EGD CONTROL BLEEDING ANY: CPT | Performed by: INTERNAL MEDICINE

## 2023-06-20 PROCEDURE — 700111 HCHG RX REV CODE 636 W/ 250 OVERRIDE (IP): Performed by: ANESTHESIOLOGY

## 2023-06-20 PROCEDURE — 82962 GLUCOSE BLOOD TEST: CPT | Mod: 91

## 2023-06-20 PROCEDURE — 700102 HCHG RX REV CODE 250 W/ 637 OVERRIDE(OP): Performed by: STUDENT IN AN ORGANIZED HEALTH CARE EDUCATION/TRAINING PROGRAM

## 2023-06-20 PROCEDURE — 85018 HEMOGLOBIN: CPT

## 2023-06-20 PROCEDURE — 99233 SBSQ HOSP IP/OBS HIGH 50: CPT | Performed by: STUDENT IN AN ORGANIZED HEALTH CARE EDUCATION/TRAINING PROGRAM

## 2023-06-20 PROCEDURE — 85014 HEMATOCRIT: CPT | Mod: 91

## 2023-06-20 PROCEDURE — 160002 HCHG RECOVERY MINUTES (STAT): Performed by: INTERNAL MEDICINE

## 2023-06-20 RX ORDER — LIDOCAINE HYDROCHLORIDE 20 MG/ML
INJECTION, SOLUTION EPIDURAL; INFILTRATION; INTRACAUDAL; PERINEURAL PRN
Status: DISCONTINUED | OUTPATIENT
Start: 2023-06-20 | End: 2023-06-20 | Stop reason: SURG

## 2023-06-20 RX ORDER — HALOPERIDOL 5 MG/ML
1 INJECTION INTRAMUSCULAR
Status: DISCONTINUED | OUTPATIENT
Start: 2023-06-20 | End: 2023-06-20 | Stop reason: HOSPADM

## 2023-06-20 RX ORDER — DIPHENHYDRAMINE HYDROCHLORIDE 50 MG/ML
12.5 INJECTION INTRAMUSCULAR; INTRAVENOUS
Status: DISCONTINUED | OUTPATIENT
Start: 2023-06-20 | End: 2023-06-20 | Stop reason: HOSPADM

## 2023-06-20 RX ORDER — ONDANSETRON 2 MG/ML
4 INJECTION INTRAMUSCULAR; INTRAVENOUS
Status: DISCONTINUED | OUTPATIENT
Start: 2023-06-20 | End: 2023-06-20 | Stop reason: HOSPADM

## 2023-06-20 RX ADMIN — CYANOCOBALAMIN TAB 500 MCG 1000 MCG: 500 TAB at 05:19

## 2023-06-20 RX ADMIN — PANTOPRAZOLE SODIUM 40 MG: 40 INJECTION, POWDER, FOR SOLUTION INTRAVENOUS at 07:50

## 2023-06-20 RX ADMIN — SENNOSIDES AND DOCUSATE SODIUM 2 TABLET: 50; 8.6 TABLET ORAL at 05:19

## 2023-06-20 RX ADMIN — ATORVASTATIN CALCIUM 10 MG: 10 TABLET, FILM COATED ORAL at 20:57

## 2023-06-20 RX ADMIN — LIDOCAINE HYDROCHLORIDE 50 MG: 20 INJECTION, SOLUTION EPIDURAL; INFILTRATION; INTRACAUDAL at 14:26

## 2023-06-20 RX ADMIN — PANTOPRAZOLE SODIUM 40 MG: 40 INJECTION, POWDER, FOR SOLUTION INTRAVENOUS at 20:58

## 2023-06-20 RX ADMIN — ALLOPURINOL 300 MG: 300 TABLET ORAL at 05:19

## 2023-06-20 RX ADMIN — PROPOFOL 100 MG: 10 INJECTION, EMULSION INTRAVENOUS at 14:26

## 2023-06-20 RX ADMIN — FOLIC ACID 1 MG: 1 TABLET ORAL at 05:19

## 2023-06-20 RX ADMIN — FERROUS GLUCONATE 324 MG: 324 TABLET ORAL at 07:50

## 2023-06-20 RX ADMIN — SODIUM CHLORIDE: 9 INJECTION, SOLUTION INTRAVENOUS at 14:21

## 2023-06-20 ASSESSMENT — COGNITIVE AND FUNCTIONAL STATUS - GENERAL
MOBILITY SCORE: 20
STANDING UP FROM CHAIR USING ARMS: A LITTLE
SUGGESTED CMS G CODE MODIFIER MOBILITY: CJ
MOVING TO AND FROM BED TO CHAIR: A LITTLE
WALKING IN HOSPITAL ROOM: A LITTLE
HELP NEEDED FOR BATHING: A LITTLE
DAILY ACTIVITIY SCORE: 20
CLIMB 3 TO 5 STEPS WITH RAILING: A LITTLE
SUGGESTED CMS G CODE MODIFIER DAILY ACTIVITY: CJ
TOILETING: A LITTLE
DRESSING REGULAR UPPER BODY CLOTHING: A LITTLE
DRESSING REGULAR LOWER BODY CLOTHING: A LITTLE

## 2023-06-20 ASSESSMENT — ENCOUNTER SYMPTOMS
MYALGIAS: 0
SHORTNESS OF BREATH: 0
BLOOD IN STOOL: 1
NAUSEA: 0
DEPRESSION: 0
FOCAL WEAKNESS: 0
WEAKNESS: 0
VOMITING: 0
FEVER: 0
PSYCHIATRIC NEGATIVE: 1
RESPIRATORY NEGATIVE: 1
MUSCULOSKELETAL NEGATIVE: 1
CHILLS: 0
BRUISES/BLEEDS EASILY: 0
CARDIOVASCULAR NEGATIVE: 1
DIAPHORESIS: 0
EYES NEGATIVE: 1
SORE THROAT: 0
ABDOMINAL PAIN: 0
HEADACHES: 0
NEUROLOGICAL NEGATIVE: 1
DIZZINESS: 0
BLURRED VISION: 0
PALPITATIONS: 0
WEIGHT LOSS: 0
COUGH: 0
CONSTITUTIONAL NEGATIVE: 1

## 2023-06-20 ASSESSMENT — PATIENT HEALTH QUESTIONNAIRE - PHQ9
2. FEELING DOWN, DEPRESSED, IRRITABLE, OR HOPELESS: NOT AT ALL
1. LITTLE INTEREST OR PLEASURE IN DOING THINGS: NOT AT ALL
SUM OF ALL RESPONSES TO PHQ9 QUESTIONS 1 AND 2: 0

## 2023-06-20 ASSESSMENT — LIFESTYLE VARIABLES: SUBSTANCE_ABUSE: 0

## 2023-06-20 ASSESSMENT — FIBROSIS 4 INDEX: FIB4 SCORE: 3.4

## 2023-06-20 NOTE — ANESTHESIA PREPROCEDURE EVALUATION
Case: [id number] Date/Time: 06/20/23 1445    Procedure: GASTROSCOPY (Esophagus)    Anesthesia type: MAC    Pre-op diagnosis: GI bleeding    Location: [facility] ROOM 26 / SURGERY SAME DAY [facility]    Surgeons: Ketty Garcia M.D.          Relevant Problems      (positive) Acute kidney injury superimposed on CKD (HCC)      Other   (positive) Acute blood loss anemia   (positive) Diabetes (HCC)   (positive) GI bleed       Physical Exam    Airway   Mallampati: II  TM distance: >3 FB  Neck ROM: full       Cardiovascular - normal exam  Rhythm: regular  Rate: normal  (-) murmur     Dental - normal exam           Pulmonary - normal exam  Breath sounds clear to auscultation     Abdominal    Neurological - normal exam                 Anesthesia Plan    ASA 2       Plan - general       Airway plan will be mask          Induction: intravenous      Pertinent diagnostic labs and testing reviewed    Informed Consent:    Anesthetic plan and risks discussed with patient.

## 2023-06-20 NOTE — ANESTHESIA TIME REPORT
Anesthesia Start and Stop Event Times     Date Time Event    6/20/2023 1410 Ready for Procedure     1421 Anesthesia Start     1449 Anesthesia Stop        Responsible Staff  06/20/23    Name Role Begin End    Nader Pace M.D. Anesth 1421 1449        Overtime Reason:  no overtime (within assigned shift)    Comments:

## 2023-06-20 NOTE — PROCEDURES
OPERATIVE REPORT    PATIENT:   Pat Cottrell   1940       PREOPERATIVE DIAGNOSES/INDICATIONS: Melena and some maroon output     POSTOPERATIVE DIAGNOSIS:   Distal duodenal vascular active bleeding r/o dieulafoy's. S/p 2 clips.     PROCEDURE:  ESOPHAGOGASTRODUODENOSCOPY    PHYSICIAN:  Ketty Garcia MD. MPH.    ANESTHESIA:  Per anesthesiologist.    LOCATION: [facility]    CONSENT:  OBTAINED. The risks, benefits and alternatives of the procedure were discussed in details. The risks include and are not limited to bleeding, infection, perforation, missed lesions, and sedations risks (cardiopulmonary compromise and allergic reaction to medications).    DESCRIPTION: The patient presented to the procedure room.  After routine checkup was performed, patient was brought into the endoscopy suite.  Patient was placed on his left lateral decubitus position. A bite block was placed in patient's mouth. Patient was sedated by anesthesia.  Vital signs were monitored throughout the procedure.  Oxygenation support was provided throughout the procedure. Upper endoscope was inserted into patient's mouth and advanced to the proximal jejunum under direct visualization.      Once the site was reached and examined, the upper endoscope was withdrawn.  Retroflexion was made within the stomach.  The stomach was decompressed, scope was withdrawn and the procedure was terminated.    The patient tolerated the procedure well.  There were no immediate complications.    OPERATIVE FINDINGS:    1. Esophagus: GERD grade A.   2. Stomach: Grossly normal. Some old blood in the antrum, removed.   3. Duodenum: Old and fresh blood in duodenum, all removed. Scars in the duodenum. One diverticula in 2nd portion. One active bleeding source was found with rapid oozing at 3-4 portion of duodenum. No ulcer base. Treated with two clips. Bleeding stop.   4. Jejunum was check for 10cm, no bleeding.     IMPRESSION/RECOMMENDATIONS:  IV PPI for total three  day. Then oral PPI.   Okay to have clear liquid for now.   If bleeding recurs, consider IR treatment with the guidance of our clips.     Inpatient GI team will continue to follow up.     This note has been transcribed with digital voice recognition software and although it has been reviewed may contain grammatical or word errors

## 2023-06-20 NOTE — ANESTHESIA POSTPROCEDURE EVALUATION
Patient: Pat Cottrell    Procedure Summary     Date: 06/20/23 Room / Location: [facility] ROOM 26 / SURGERY SAME DAY [facility]    Anesthesia Start: 1421 Anesthesia Stop: 1449    Procedures:       GASTROSCOPY (Esophagus)      EGD, WITH CLIP PLACEMENT (Esophagus) Diagnosis: (Active Bleed Distal Duodenal )    Surgeons: Ketty Garcia M.D. Responsible Provider: Nader Pace M.D.    Anesthesia Type: general ASA Status: 2          Final Anesthesia Type: general  Last vitals  BP   Blood Pressure : 121/70    Temp   37.4 °C (99.4 °F)    Pulse   99   Resp   17    SpO2   94 %      Anesthesia Post Evaluation    Patient location during evaluation: PACU  Patient participation: complete - patient participated  Level of consciousness: awake and alert    Airway patency: patent  Anesthetic complications: no  Cardiovascular status: hemodynamically stable  Respiratory status: acceptable  Hydration status: euvolemic    PONV: none          There were no known notable events for this encounter.     Nurse Pain Score: 0 (NPRS)

## 2023-06-20 NOTE — OR NURSING
1447 from OR to PACU 1. Connected to monitor. Report received from anesthesia & RN. VSS. 02 6L via mask. Breaths calm, even, unlabored.no FSBS needed per anesthesia     1501 family updated     1507 patient tolerating po water     1515  report given to Rosemary GOLDSMITH     1518 patient transported to room, via [facility], tele monitor with RN. Son in patient room.

## 2023-06-20 NOTE — CARE PLAN
The patient is Stable - Low risk of patient condition declining or worsening    Shift Goals  Clinical Goals: Monitor Hemodynamics  Patient Goals: MARSHALL  Family Goals: MARSHALL    Progress made toward(s) clinical / shift goals:    Problem: Knowledge Deficit - Standard  Goal: Patient and family/care givers will demonstrate understanding of plan of care, disease process/condition, diagnostic tests and medications  Outcome: Progressing

## 2023-06-21 LAB
ANION GAP SERPL CALC-SCNC: 12 MMOL/L (ref 7–16)
BASOPHILS # BLD AUTO: 0.3 % (ref 0–1.8)
BASOPHILS # BLD AUTO: 0.3 % (ref 0–1.8)
BASOPHILS # BLD: 0.03 K/UL (ref 0–0.12)
BASOPHILS # BLD: 0.03 K/UL (ref 0–0.12)
BUN SERPL-MCNC: 52 MG/DL (ref 8–22)
CALCIUM SERPL-MCNC: 8.8 MG/DL (ref 8.5–10.5)
CHLORIDE SERPL-SCNC: 106 MMOL/L (ref 96–112)
CO2 SERPL-SCNC: 21 MMOL/L (ref 20–33)
CREAT SERPL-MCNC: 1.27 MG/DL (ref 0.5–1.4)
EOSINOPHIL # BLD AUTO: 0.17 K/UL (ref 0–0.51)
EOSINOPHIL # BLD AUTO: 0.21 K/UL (ref 0–0.51)
EOSINOPHIL NFR BLD: 1.8 % (ref 0–6.9)
EOSINOPHIL NFR BLD: 2.1 % (ref 0–6.9)
ERYTHROCYTE [DISTWIDTH] IN BLOOD BY AUTOMATED COUNT: 54 FL (ref 35.9–50)
ERYTHROCYTE [DISTWIDTH] IN BLOOD BY AUTOMATED COUNT: 55.8 FL (ref 35.9–50)
ERYTHROCYTE [DISTWIDTH] IN BLOOD BY AUTOMATED COUNT: 55.8 FL (ref 35.9–50)
ERYTHROCYTE [DISTWIDTH] IN BLOOD BY AUTOMATED COUNT: 56.8 FL (ref 35.9–50)
GFR SERPLBLD CREATININE-BSD FMLA CKD-EPI: 42 ML/MIN/1.73 M 2
GLUCOSE BLD STRIP.AUTO-MCNC: 118 MG/DL (ref 65–99)
GLUCOSE BLD STRIP.AUTO-MCNC: 120 MG/DL (ref 65–99)
GLUCOSE BLD STRIP.AUTO-MCNC: 127 MG/DL (ref 65–99)
GLUCOSE BLD STRIP.AUTO-MCNC: 133 MG/DL (ref 65–99)
GLUCOSE SERPL-MCNC: 129 MG/DL (ref 65–99)
HCT VFR BLD AUTO: 22.1 % (ref 37–47)
HCT VFR BLD AUTO: 23.9 % (ref 37–47)
HCT VFR BLD AUTO: 24.1 % (ref 37–47)
HCT VFR BLD AUTO: 24.6 % (ref 37–47)
HGB BLD-MCNC: 7 G/DL (ref 12–16)
HGB BLD-MCNC: 7.7 G/DL (ref 12–16)
HGB BLD-MCNC: 7.9 G/DL (ref 12–16)
HGB BLD-MCNC: 7.9 G/DL (ref 12–16)
IMM GRANULOCYTES # BLD AUTO: 0.07 K/UL (ref 0–0.11)
IMM GRANULOCYTES # BLD AUTO: 0.09 K/UL (ref 0–0.11)
IMM GRANULOCYTES NFR BLD AUTO: 0.7 % (ref 0–0.9)
IMM GRANULOCYTES NFR BLD AUTO: 0.9 % (ref 0–0.9)
LYMPHOCYTES # BLD AUTO: 1.27 K/UL (ref 1–4.8)
LYMPHOCYTES # BLD AUTO: 1.62 K/UL (ref 1–4.8)
LYMPHOCYTES NFR BLD: 13.3 % (ref 22–41)
LYMPHOCYTES NFR BLD: 16.5 % (ref 22–41)
MCH RBC QN AUTO: 30.4 PG (ref 27–33)
MCH RBC QN AUTO: 31.1 PG (ref 27–33)
MCH RBC QN AUTO: 31.2 PG (ref 27–33)
MCH RBC QN AUTO: 31.3 PG (ref 27–33)
MCHC RBC AUTO-ENTMCNC: 31.7 G/DL (ref 32.2–35.5)
MCHC RBC AUTO-ENTMCNC: 32.1 G/DL (ref 32.2–35.5)
MCHC RBC AUTO-ENTMCNC: 32.2 G/DL (ref 32.2–35.5)
MCHC RBC AUTO-ENTMCNC: 32.8 G/DL (ref 32.2–35.5)
MCV RBC AUTO: 94.9 FL (ref 81.4–97.8)
MCV RBC AUTO: 96.1 FL (ref 81.4–97.8)
MCV RBC AUTO: 97.2 FL (ref 81.4–97.8)
MCV RBC AUTO: 97.2 FL (ref 81.4–97.8)
MONOCYTES # BLD AUTO: 0.94 K/UL (ref 0–0.85)
MONOCYTES # BLD AUTO: 1.11 K/UL (ref 0–0.85)
MONOCYTES NFR BLD AUTO: 11.6 % (ref 0–13.4)
MONOCYTES NFR BLD AUTO: 9.6 % (ref 0–13.4)
NEUTROPHILS # BLD AUTO: 6.9 K/UL (ref 1.82–7.42)
NEUTROPHILS # BLD AUTO: 6.93 K/UL (ref 1.82–7.42)
NEUTROPHILS NFR BLD: 70.6 % (ref 44–72)
NEUTROPHILS NFR BLD: 72.3 % (ref 44–72)
NRBC # BLD AUTO: 0 K/UL
NRBC # BLD AUTO: 0.04 K/UL
NRBC BLD-RTO: 0 /100 WBC (ref 0–0.2)
NRBC BLD-RTO: 0.4 /100 WBC (ref 0–0.2)
PLATELET # BLD AUTO: 169 K/UL (ref 164–446)
PLATELET # BLD AUTO: 170 K/UL (ref 164–446)
PLATELET # BLD AUTO: 173 K/UL (ref 164–446)
PLATELET # BLD AUTO: 175 K/UL (ref 164–446)
PMV BLD AUTO: 9.7 FL (ref 9–12.9)
PMV BLD AUTO: 9.8 FL (ref 9–12.9)
PMV BLD AUTO: 9.9 FL (ref 9–12.9)
PMV BLD AUTO: 9.9 FL (ref 9–12.9)
POTASSIUM SERPL-SCNC: 3.7 MMOL/L (ref 3.6–5.5)
RBC # BLD AUTO: 2.3 M/UL (ref 4.2–5.4)
RBC # BLD AUTO: 2.46 M/UL (ref 4.2–5.4)
RBC # BLD AUTO: 2.53 M/UL (ref 4.2–5.4)
RBC # BLD AUTO: 2.54 M/UL (ref 4.2–5.4)
SODIUM SERPL-SCNC: 139 MMOL/L (ref 135–145)
WBC # BLD AUTO: 7.9 K/UL (ref 4.8–10.8)
WBC # BLD AUTO: 9.4 K/UL (ref 4.8–10.8)
WBC # BLD AUTO: 9.6 K/UL (ref 4.8–10.8)
WBC # BLD AUTO: 9.8 K/UL (ref 4.8–10.8)

## 2023-06-21 PROCEDURE — 700111 HCHG RX REV CODE 636 W/ 250 OVERRIDE (IP): Performed by: STUDENT IN AN ORGANIZED HEALTH CARE EDUCATION/TRAINING PROGRAM

## 2023-06-21 PROCEDURE — 85027 COMPLETE CBC AUTOMATED: CPT

## 2023-06-21 PROCEDURE — 700102 HCHG RX REV CODE 250 W/ 637 OVERRIDE(OP): Performed by: STUDENT IN AN ORGANIZED HEALTH CARE EDUCATION/TRAINING PROGRAM

## 2023-06-21 PROCEDURE — 770020 HCHG ROOM/CARE - TELE (206)

## 2023-06-21 PROCEDURE — 99233 SBSQ HOSP IP/OBS HIGH 50: CPT | Performed by: STUDENT IN AN ORGANIZED HEALTH CARE EDUCATION/TRAINING PROGRAM

## 2023-06-21 PROCEDURE — 80048 BASIC METABOLIC PNL TOTAL CA: CPT

## 2023-06-21 PROCEDURE — 99231 SBSQ HOSP IP/OBS SF/LOW 25: CPT | Performed by: INTERNAL MEDICINE

## 2023-06-21 PROCEDURE — 36415 COLL VENOUS BLD VENIPUNCTURE: CPT

## 2023-06-21 PROCEDURE — A9270 NON-COVERED ITEM OR SERVICE: HCPCS | Performed by: STUDENT IN AN ORGANIZED HEALTH CARE EDUCATION/TRAINING PROGRAM

## 2023-06-21 PROCEDURE — C9113 INJ PANTOPRAZOLE SODIUM, VIA: HCPCS | Performed by: STUDENT IN AN ORGANIZED HEALTH CARE EDUCATION/TRAINING PROGRAM

## 2023-06-21 PROCEDURE — 82962 GLUCOSE BLOOD TEST: CPT | Mod: 91

## 2023-06-21 PROCEDURE — 85025 COMPLETE CBC W/AUTO DIFF WBC: CPT | Mod: 91

## 2023-06-21 PROCEDURE — 700105 HCHG RX REV CODE 258: Performed by: STUDENT IN AN ORGANIZED HEALTH CARE EDUCATION/TRAINING PROGRAM

## 2023-06-21 RX ORDER — PANTOPRAZOLE SODIUM 40 MG/10ML
40 INJECTION, POWDER, LYOPHILIZED, FOR SOLUTION INTRAVENOUS 2 TIMES DAILY
Status: COMPLETED | OUTPATIENT
Start: 2023-06-21 | End: 2023-06-21

## 2023-06-21 RX ORDER — OMEPRAZOLE 20 MG/1
40 CAPSULE, DELAYED RELEASE ORAL 2 TIMES DAILY
Status: DISCONTINUED | OUTPATIENT
Start: 2023-06-22 | End: 2023-06-22 | Stop reason: HOSPADM

## 2023-06-21 RX ADMIN — ALLOPURINOL 300 MG: 300 TABLET ORAL at 05:21

## 2023-06-21 RX ADMIN — FOLIC ACID 1 MG: 1 TABLET ORAL at 05:21

## 2023-06-21 RX ADMIN — PANTOPRAZOLE SODIUM 40 MG: 40 INJECTION, POWDER, FOR SOLUTION INTRAVENOUS at 08:20

## 2023-06-21 RX ADMIN — PANTOPRAZOLE SODIUM 40 MG: 40 INJECTION, POWDER, FOR SOLUTION INTRAVENOUS at 21:40

## 2023-06-21 RX ADMIN — SODIUM CHLORIDE: 9 INJECTION, SOLUTION INTRAVENOUS at 10:23

## 2023-06-21 RX ADMIN — SENNOSIDES AND DOCUSATE SODIUM 2 TABLET: 50; 8.6 TABLET ORAL at 05:21

## 2023-06-21 RX ADMIN — FERROUS GLUCONATE 324 MG: 324 TABLET ORAL at 08:20

## 2023-06-21 RX ADMIN — CYANOCOBALAMIN TAB 500 MCG 1000 MCG: 500 TAB at 05:21

## 2023-06-21 RX ADMIN — ATORVASTATIN CALCIUM 10 MG: 10 TABLET, FILM COATED ORAL at 21:40

## 2023-06-21 RX ADMIN — SENNOSIDES AND DOCUSATE SODIUM 2 TABLET: 50; 8.6 TABLET ORAL at 17:10

## 2023-06-21 ASSESSMENT — ENCOUNTER SYMPTOMS
CARDIOVASCULAR NEGATIVE: 1
DIZZINESS: 0
EYES NEGATIVE: 1
BLURRED VISION: 0
SORE THROAT: 0
DIAPHORESIS: 0
MUSCULOSKELETAL NEGATIVE: 1
BRUISES/BLEEDS EASILY: 0
NAUSEA: 0
WEAKNESS: 0
SHORTNESS OF BREATH: 0
WEIGHT LOSS: 0
HEADACHES: 0
COUGH: 0
RESPIRATORY NEGATIVE: 1
NEUROLOGICAL NEGATIVE: 1
FOCAL WEAKNESS: 0
VOMITING: 0
MYALGIAS: 0
DEPRESSION: 0
FEVER: 0
CHILLS: 0
ABDOMINAL PAIN: 0
CONSTITUTIONAL NEGATIVE: 1
BLOOD IN STOOL: 1
PSYCHIATRIC NEGATIVE: 1
PALPITATIONS: 0

## 2023-06-21 ASSESSMENT — FIBROSIS 4 INDEX: FIB4 SCORE: 3.12

## 2023-06-21 ASSESSMENT — LIFESTYLE VARIABLES: SUBSTANCE_ABUSE: 0

## 2023-06-21 NOTE — CARE PLAN
Problem: Knowledge Deficit - Standard  Goal: Patient and family/care givers will demonstrate understanding of plan of care, disease process/condition, diagnostic tests and medications  Outcome: Progressing     Problem: Hemodynamics  Goal: Patient's hemodynamics, fluid balance and neurologic status will be stable or improve  Outcome: Progressing   The patient is Stable - Low risk of patient condition declining or worsening    Shift Goals  Clinical Goals: Monitor hemoglobin  Patient Goals: Rest and comfort  Family Goals: MARSHALL    Progress made toward(s) clinical / shift goals:  Pt and family educated on POC, all questions answered in regards to disease process, treatment and diet. Pt and family verbalize understanding and voice no further questions at this time.

## 2023-06-21 NOTE — PROGRESS NOTES
[facility] Medicine Daily Progress Note    Date of Service  6/20/2023    Chief Complaint  Pat Cottrell is a 83 y.o. female admitted 6/18/2023 with bloody stool     Hospital Course  Patient is an 83 year old female with multiple comorbidities who presented to [facility] on 6/18/2023 for evaluation of bloody stool. History obtained from patient's son who was at bedside in ER as patient is Ivorian-speaking only.  It was reported that patient has been having intermittent dark tarry stool for the past 8 days.     Work-up from [facility] included:  CBC: WBC 10.3, hemoglobin 5.5, hematocrit 16.5, platelet 191  PT 15.1  INR 1.2  CMP: Sodium 133, potassium 4.1, chloride 101, bicarb 21, calcium 8.9, , creatinine 2.13, glucose 141, total protein 5.9, albumin 3.2, total bilirubin 0.3, alkaline phosphatase 53, AST 20, ALT 13     Patient was given Protonix 80 mg IV and 1 unit PRBC.  Subsequently transferred to [facility] for higher level care and GI evaluation. Repeat hemoglobin is 7.0.  No further bleeding seen since [facility] ER arrival.  Admitted to medicine service for further evaluation and treatment.    Interval Problem Update  Axox3, family at bedside, no bm since admit, she denies abdominal pain, no chest pain, no dizziness. NO sob, no nausea. Mild tachycardia. ROS otherwise negative. I discussed with GI- they will come eval     6/20/2023:  Patient seen and evaluated at bedside patient underwent EGD today with gastroenterology is recommendations are appreciated.  Patient had and findings that were significant for grossly normal stomach with some old blood that was subsequently removed duodenum was subsequently examined which showed evidence of old scarring in addition 1 active bleeding source was found with rapid oozing at the 3rd-4th portion of the duodenum there was no evidence of ulcer base this was treated with 2 clips and the bleeding subsequently stopped.  Patient will continue on PPI therapy in  the form of IV for a total of 3 days and subsequent transition to oral PPI.  Patient will continue on liquid diet at present.    I have discussed this patient's plan of care and discharge plan at IDT rounds today with Case Management, Nursing, Nursing leadership, and other members of the IDT team.    Consultants/Specialty  GI    Code Status  Full Code    Disposition    I have placed the appropriate orders for post-discharge needs.    Review of Systems  Review of Systems   Constitutional: Negative.  Negative for chills, diaphoresis, fever, malaise/fatigue and weight loss.   HENT: Negative.  Negative for sore throat.    Eyes: Negative.  Negative for blurred vision.   Respiratory: Negative.  Negative for cough and shortness of breath.    Cardiovascular: Negative.  Negative for chest pain, palpitations and leg swelling.   Gastrointestinal:  Positive for blood in stool. Negative for abdominal pain, nausea and vomiting.   Genitourinary: Negative.  Negative for dysuria.   Musculoskeletal: Negative.  Negative for myalgias.   Skin: Negative.  Negative for itching and rash.   Neurological: Negative.  Negative for dizziness, focal weakness, weakness and headaches.   Endo/Heme/Allergies: Negative.  Does not bruise/bleed easily.   Psychiatric/Behavioral: Negative.  Negative for depression, substance abuse and suicidal ideas.    All other systems reviewed and are negative.       Physical Exam  Temp:  [36.3 °C (97.3 °F)-37.4 °C (99.4 °F)] 36.4 °C (97.5 °F)  Pulse:  [] 101  Resp:  [15-20] 16  BP: (110-144)/(62-87) 131/80  SpO2:  [93 %-99 %] 94 %    Physical Exam  Vitals and nursing note reviewed. Exam conducted with a chaperone present.   Constitutional:       General: She is not in acute distress.     Appearance: Normal appearance. She is not diaphoretic.   HENT:      Head: Normocephalic.      Nose: Nose normal.      Mouth/Throat:      Mouth: Mucous membranes are moist.   Eyes:      Pupils: Pupils are equal, round, and  reactive to light.   Cardiovascular:      Rate and Rhythm: Normal rate and regular rhythm.      Pulses: Normal pulses.      Heart sounds: Normal heart sounds.   Pulmonary:      Effort: Pulmonary effort is normal.      Breath sounds: Normal breath sounds.   Abdominal:      General: Abdomen is flat. Bowel sounds are normal.      Palpations: Abdomen is soft.   Musculoskeletal:         General: No swelling or deformity. Normal range of motion.   Skin:     General: Skin is warm and dry.      Capillary Refill: Capillary refill takes less than 2 seconds.   Neurological:      General: No focal deficit present.      Mental Status: She is alert and oriented to person, place, and time.      Cranial Nerves: No cranial nerve deficit.   Psychiatric:         Mood and Affect: Mood normal.         Behavior: Behavior normal.         Fluids    Intake/Output Summary (Last 24 hours) at 6/20/2023 1845  Last data filed at 6/20/2023 1448  Gross per 24 hour   Intake 454.79 ml   Output --   Net 454.79 ml       Laboratory  Recent Labs     06/18/23 2053 06/19/23 0323 06/19/23  1132 06/20/23  0115 06/20/23  0904 06/20/23  1716   WBC 10.4 10.2  --  9.1  --   --    RBC 2.23* 2.26*  --  2.56*  --   --    HEMOGLOBIN 7.0* 7.2*   < > 7.8* 7.9* 8.0*   HEMATOCRIT 21.6* 21.3*   < > 24.3* 24.7* 24.4*   MCV 96.9 94.2  --  94.9  --   --    MCH 31.4 31.9  --  30.5  --   --    MCHC 32.4 33.8  --  32.1*  --   --    RDW 53.1* 50.6*  --  52.4*  --   --    PLATELETCT 184 157*  --  161*  --   --    MPV 10.3 10.0  --  9.9  --   --     < > = values in this interval not displayed.     Recent Labs     06/18/23 2053 06/19/23  0323 06/20/23  0115   SODIUM 132* 137 138   POTASSIUM 4.1 4.3 4.6   CHLORIDE 99 104 107   CO2 20 20 23   GLUCOSE 121* 118* 145*   * 98* 72*   CREATININE 1.95* 1.85* 1.69*   CALCIUM 9.0 8.9 8.8     Recent Labs     06/18/23 2053   APTT 24.0*   INR 1.19*               Imaging  No orders to display        Assessment/Plan  * GI bleed-  (present on admission)  Assessment & Plan    Melena  H/h increased from 7 to 7.2, no melena overnight  Will continue PPI and octreotide  Will continue serial h/h and will transfuse prbc if hbg less 7   Avoid NSAID product  Mild tachycardia but bp stable, following closely, nursing asked to place two large bore iv  Discussed with GI, they will see  6/20/2023:  Patient with evidence of active bleeding in 3rd-4th portion of duodenum this was treated with 2 clips.  Patient will continue on IV PPI twice daily for total of 72 hours subsequent transition to oral PPI therapy twice daily thereafter for at least 6 to 8 weeks GI recommendations appreciated continue on liquid diet at present.      Thrombocytopenia (HCC)  Assessment & Plan  Mild  following    Class 2 obesity in adult  Assessment & Plan  Diet and lifestyle modification  Body mass index is 35.49 kg/m².      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 review of tests) obtaining and/or reviewing separately obtained history. Performing a medically appropriate examination and/ evaluation.  Counseling and educating the patient/family/caregiver.  Ordering medications, tests, or procedures.  Referring and communicating with other health care professionals.  Documenting clinical information in EPIC.  Independently interpreting results and communicating results to  patient/family/caregiver.  Care coordination.    Please note that this dictation was created using voice recognition software. I have made every reasonable attempt to correct obvious errors, but I expect that there are errors of grammar and possibly context that I did not discover before finalizing the note.    VTE prophylaxis: SCDs/TEDs and pharmacologic prophylaxis contraindicated due to bleeding    I have performed a physical exam and reviewed and updated ROS and Plan today (6/20/2023). In review of yesterday's note (6/19/2023), there are no changes except as documented above.

## 2023-06-21 NOTE — PROGRESS NOTES
Patient: : Pat Cottrell  MRN: [id number]      Diagnosis: Upper GI bleeding, distal duodenal oozing s/p Hemoclip x2.    We saw the patient at bedside today in the morning with iPad .    Since our procedure, the patient only had 1 small semiformed dark to tarry bowel movement, which is considered improvement for her GI bleeding.  Hemoglobin relative stable.  The patient denies any upper GI symptoms including nausea vomiting epigastric pain when we saw her.    Recommendation,   Complete total 3 days IV twice daily PPI then changed to oral twice daily.  Okay to advance diet by the primary team.      If bleeding is noted again please call us back.  Otherwise GI team sign off June 21.        Labs:  Recent Labs     06/20/23  0115 06/20/23  0904 06/20/23  1716 06/21/23  0059 06/21/23 0603   WBC 9.1  --   --  9.6 9.8   RBC 2.56*  --   --  2.46* 2.54*   HEMOGLOBIN 7.8*   < > 8.0* 7.7* 7.9*   HEMATOCRIT 24.3*   < > 24.4* 23.9* 24.1*   MCV 94.9  --   --  97.2 94.9   MCH 30.5  --   --  31.3 31.1   MCHC 32.1*  --   --  32.2 32.8   RDW 52.4*  --   --  55.8* 54.0*   PLATELETCT 161*  --   --  169 173   MPV 9.9  --   --  9.9 9.7    < > = values in this interval not displayed.     Recent Labs     06/19/23 0323 06/20/23 0115 06/21/23  0059   SODIUM 137 138 139   POTASSIUM 4.3 4.6 3.7   CHLORIDE 104 107 106   CO2 20 23 21   GLUCOSE 118* 145* 129*   BUN 98* 72* 52*     Recent Labs     06/18/23 2053   APTT 24.0*   INR 1.19*       Recent Labs     06/18/23 2053 06/19/23 0323   ASTSGOT 21 20   ALTSGPT 10 9   TBILIRUBIN 0.3 0.2   ALKPHOSPHAT 64 56   GLOBULIN 2.3 2.2   INR 1.19*  --

## 2023-06-21 NOTE — CARE PLAN
The patient is Stable - Low risk of patient condition declining or worsening    Shift Goals  Clinical Goals: Q 8 hemoglobin, NPO at midnight  Patient Goals: Rest  Family Goals: Pt comfort    Progress made toward(s) clinical / shift goals:    Problem: Knowledge Deficit - Standard  Goal: Patient and family/care givers will demonstrate understanding of plan of care, disease process/condition, diagnostic tests and medications  Outcome: Progressing     Problem: Hemodynamics  Goal: Patient's hemodynamics, fluid balance and neurologic status will be stable or improve  Outcome: Progressing     Problem: Fluid Volume  Goal: Fluid volume balance will be maintained  Outcome: Progressing

## 2023-06-22 VITALS
OXYGEN SATURATION: 95 % | WEIGHT: 162.7 LBS | DIASTOLIC BLOOD PRESSURE: 63 MMHG | HEIGHT: 57 IN | HEART RATE: 85 BPM | SYSTOLIC BLOOD PRESSURE: 128 MMHG | TEMPERATURE: 97.9 F | RESPIRATION RATE: 16 BRPM | BODY MASS INDEX: 35.1 KG/M2

## 2023-06-22 LAB
GLUCOSE BLD STRIP.AUTO-MCNC: 111 MG/DL (ref 65–99)
GLUCOSE BLD STRIP.AUTO-MCNC: 143 MG/DL (ref 65–99)

## 2023-06-22 PROCEDURE — A9270 NON-COVERED ITEM OR SERVICE: HCPCS | Performed by: STUDENT IN AN ORGANIZED HEALTH CARE EDUCATION/TRAINING PROGRAM

## 2023-06-22 PROCEDURE — 700102 HCHG RX REV CODE 250 W/ 637 OVERRIDE(OP): Performed by: STUDENT IN AN ORGANIZED HEALTH CARE EDUCATION/TRAINING PROGRAM

## 2023-06-22 PROCEDURE — 82962 GLUCOSE BLOOD TEST: CPT | Mod: 91

## 2023-06-22 PROCEDURE — 99239 HOSP IP/OBS DSCHRG MGMT >30: CPT | Performed by: STUDENT IN AN ORGANIZED HEALTH CARE EDUCATION/TRAINING PROGRAM

## 2023-06-22 RX ORDER — SUCRALFATE 1 G/1
1 TABLET ORAL
Qty: 120 TABLET | Refills: 3 | Status: SHIPPED | OUTPATIENT
Start: 2023-06-22

## 2023-06-22 RX ORDER — OMEPRAZOLE 40 MG/1
40 CAPSULE, DELAYED RELEASE ORAL 2 TIMES DAILY
Qty: 120 CAPSULE | Refills: 0 | Status: SHIPPED | OUTPATIENT
Start: 2023-06-22 | End: 2023-08-21

## 2023-06-22 RX ORDER — FERROUS GLUCONATE 324(38)MG
324 TABLET ORAL
Qty: 30 TABLET | Refills: 3 | Status: SHIPPED | OUTPATIENT
Start: 2023-06-23

## 2023-06-22 RX ADMIN — OMEPRAZOLE 40 MG: 20 CAPSULE, DELAYED RELEASE ORAL at 05:38

## 2023-06-22 RX ADMIN — FERROUS GLUCONATE 324 MG: 324 TABLET ORAL at 09:04

## 2023-06-22 RX ADMIN — CYANOCOBALAMIN TAB 500 MCG 1000 MCG: 500 TAB at 05:38

## 2023-06-22 RX ADMIN — ALLOPURINOL 300 MG: 300 TABLET ORAL at 05:38

## 2023-06-22 RX ADMIN — FOLIC ACID 1 MG: 1 TABLET ORAL at 05:39

## 2023-06-22 NOTE — PROGRESS NOTES
Patient is alert and oriented x4. Family at bedside. Denies pain. On room air. No signs of distress.All needs and questions met at this time. Call light within reach. Bed at lowest position.

## 2023-06-22 NOTE — DISCHARGE PLANNING
Case Management Discharge Planning    Admission Date: 6/18/2023  GMLOS: 3  ALOS: 4    6-Clicks ADL Score: 20  6-Clicks Mobility Score: 20      Anticipated Discharge Dispo: Discharge Disposition: Discharged to home/self care (01)    DME Needed: No    Action(s) Taken: LSW met with pt's son, was at bedside, regarding pt's assessment and d/c plan. Pt was asleep. Per pt's son, Tavo, she will be going with home with him when she is ready for d/c.     Escalations Completed: None    Medically Clear: Yes    Barriers to Discharge: None    Care Transition Team Assessment    LMSW met with pt at bedside to complete assessment. Pt A&Ox4 and able to verify the information on the face sheet. Pt will be living with son, Tavo, in [place]. Pt does not use any DME at baseline. Per Tavo, pt has good family support. Pt denies any SA or MH concerns.     Tavo's address is [address] [place] [address]. Phone number [phone].    Information Source  Informant's Name: Tavo Da Silva    Elopement Risk  Legal Hold: No  Ambulatory or Self Mobile in Wheelchair: Yes  Disoriented: No  Psychiatric Symptoms: None  History of Wandering: No  Elopement this Admit: No  Vocalizing Wanting to Leave: No  Displays Behaviors, Body Language Wanting to Leave: No-Not at Risk for Elopement  Elopement Risk: Not at Risk for Elopement    Interdisciplinary Discharge Planning  Lives with - Patient's Self Care Capacity: Adult Children  Patient or legal guardian wants to designate a caregiver: No  Support Systems: Family Member(s)  Housing / Facility: [address] [facility]  Durable Medical Equipment: Not Applicable    Vision / Hearing Impairment  Vision Impairment : No  Hearing Impairment : No    Domestic Abuse  Have you ever been the victim of abuse or violence?: No  Physical Abuse or Sexual Abuse: No  Verbal Abuse or Emotional Abuse: No  Possible Abuse/Neglect Reported to:: Not Applicable    Anticipated Discharge Information  Discharge Disposition:  Discharged to home/self care (01)

## 2023-06-22 NOTE — CARE PLAN
The patient is Stable - Low risk of patient condition declining or worsening    Shift Goals  Clinical Goals: monitor hgb  Patient Goals: rest  Family Goals: MARSHALL    Progress made toward(s) clinical / shift goals:        Problem: Hemodynamics  Goal: Patient's hemodynamics, fluid balance and neurologic status will be stable or improve  Outcome: Progressing     Problem: Urinary - Renal Perfusion  Goal: Ability to achieve and maintain adequate renal perfusion and functioning will improve  Outcome: Progressing       Patient is not progressing towards the following goals:

## 2023-06-22 NOTE — CARE PLAN
The patient is Stable - Low risk of patient condition declining or worsening    Shift Goals  Clinical Goals: monitor hgb  Patient Goals: rest  Family Goals: MARSHALL    Progress made toward(s) clinical / shift goals:      Patient is not progressing towards the following goals:

## 2023-06-22 NOTE — PROGRESS NOTES
[facility] Medicine Daily Progress Note    Date of Service  6/21/2023    Chief Complaint  Pat Cottrell is a 83 y.o. female admitted 6/18/2023 with bloody stool     Hospital Course  Patient is an 83 year old female with multiple comorbidities who presented to [facility] on 6/18/2023 for evaluation of bloody stool. History obtained from patient's son who was at bedside in ER as patient is Tunisian-speaking only.  It was reported that patient has been having intermittent dark tarry stool for the past 8 days.     Work-up from [place] ER included:  CBC: WBC 10.3, hemoglobin 5.5, hematocrit 16.5, platelet 191  PT 15.1  INR 1.2  CMP: Sodium 133, potassium 4.1, chloride 101, bicarb 21, calcium 8.9, , creatinine 2.13, glucose 141, total protein 5.9, albumin 3.2, total bilirubin 0.3, alkaline phosphatase 53, AST 20, ALT 13     Patient was given Protonix 80 mg IV and 1 unit PRBC.  Subsequently transferred to [facility] for higher level care and GI evaluation. Repeat hemoglobin is 7.0.  No further bleeding seen since [facility] ER arrival.  Admitted to medicine service for further evaluation and treatment.    Interval Problem Update  Axox3, family at bedside, no bm since admit, she denies abdominal pain, no chest pain, no dizziness. NO sob, no nausea. Mild tachycardia. ROS otherwise negative. I discussed with GI- they will come eval     6/20/2023:  Patient seen and evaluated at bedside patient underwent EGD today with gastroenterology is recommendations are appreciated.  Patient had and findings that were significant for grossly normal stomach with some old blood that was subsequently removed duodenum was subsequently examined which showed evidence of old scarring in addition 1 active bleeding source was found with rapid oozing at the 3rd-4th portion of the duodenum there was no evidence of ulcer base this was treated with 2 clips and the bleeding subsequently stopped.  Patient will continue on PPI therapy in  the form of IV for a total of 3 days and subsequent transition to oral PPI.  Patient will continue on liquid diet at present.  6/21/2023:  Continue IV PPI therapy the last dose of IV PPI on morning of 6/22/2023 for total of 3 days.  Patient will then transition to oral PPI therapy which she will continue for total of 8 weeks.  This will be 40 mg omeprazole twice daily.  Medication will be provided to her in hand prior to discharge.  Patient's hemoglobin continues to trend from 7.9-8.0.  We will have repeat hemoglobin in the morning to ensure there is no repeated bleeding.  Otherwise continue advance diet as tolerated.  Appreciate GI recommendations.  I have discussed this patient's plan of care and discharge plan at IDT rounds today with Case Management, Nursing, Nursing leadership, and other members of the IDT team.    Consultants/Specialty  GI    Code Status  Full Code    Disposition    I have placed the appropriate orders for post-discharge needs.    Review of Systems  Review of Systems   Constitutional: Negative.  Negative for chills, diaphoresis, fever, malaise/fatigue and weight loss.   HENT: Negative.  Negative for sore throat.    Eyes: Negative.  Negative for blurred vision.   Respiratory: Negative.  Negative for cough and shortness of breath.    Cardiovascular: Negative.  Negative for chest pain, palpitations and leg swelling.   Gastrointestinal:  Positive for blood in stool. Negative for abdominal pain, nausea and vomiting.   Genitourinary: Negative.  Negative for dysuria.   Musculoskeletal: Negative.  Negative for myalgias.   Skin: Negative.  Negative for itching and rash.   Neurological: Negative.  Negative for dizziness, focal weakness, weakness and headaches.   Endo/Heme/Allergies: Negative.  Does not bruise/bleed easily.   Psychiatric/Behavioral: Negative.  Negative for depression, substance abuse and suicidal ideas.    All other systems reviewed and are negative.       Physical Exam  Temp:  [36.1 °C  (97 °F)-36.8 °C (98.2 °F)] 36.8 °C (98.2 °F)  Pulse:  [] 98  Resp:  [16-19] 16  BP: (122-138)/(59-75) 132/73  SpO2:  [94 %-97 %] 97 %    Physical Exam  Vitals and nursing note reviewed. Exam conducted with a chaperone present.   Constitutional:       General: She is not in acute distress.     Appearance: Normal appearance. She is not diaphoretic.   HENT:      Head: Normocephalic.      Nose: Nose normal.      Mouth/Throat:      Mouth: Mucous membranes are moist.   Eyes:      Pupils: Pupils are equal, round, and reactive to light.   Cardiovascular:      Rate and Rhythm: Normal rate and regular rhythm.      Pulses: Normal pulses.      Heart sounds: Normal heart sounds.   Pulmonary:      Effort: Pulmonary effort is normal.      Breath sounds: Normal breath sounds.   Abdominal:      General: Abdomen is flat. Bowel sounds are normal.      Palpations: Abdomen is soft.   Musculoskeletal:         General: No swelling or deformity. Normal range of motion.   Skin:     General: Skin is warm and dry.      Capillary Refill: Capillary refill takes less than 2 seconds.   Neurological:      General: No focal deficit present.      Mental Status: She is alert and oriented to person, place, and time.      Cranial Nerves: No cranial nerve deficit.   Psychiatric:         Mood and Affect: Mood normal.         Behavior: Behavior normal.         Fluids    Intake/Output Summary (Last 24 hours) at 6/21/2023 1800  Last data filed at 6/20/2023 2000  Gross per 24 hour   Intake 240 ml   Output --   Net 240 ml         Laboratory  Recent Labs     06/21/23  0603 06/21/23  1645 06/21/23  1726   WBC 9.8 7.9 9.4   RBC 2.54* 2.30* 2.53*   HEMOGLOBIN 7.9* 7.0* 7.9*   HEMATOCRIT 24.1* 22.1* 24.6*   MCV 94.9 96.1 97.2   MCH 31.1 30.4 31.2   MCHC 32.8 31.7* 32.1*   RDW 54.0* 55.8* 56.8*   PLATELETCT 173 170 175   MPV 9.7 9.8 9.9       Recent Labs     06/19/23  0323 06/20/23  0115 06/21/23  0059   SODIUM 137 138 139   POTASSIUM 4.3 4.6 3.7   CHLORIDE  104 107 106   CO2 20 23 21   GLUCOSE 118* 145* 129*   BUN 98* 72* 52*   CREATININE 1.85* 1.69* 1.27   CALCIUM 8.9 8.8 8.8       Recent Labs     06/18/23 2053   APTT 24.0*   INR 1.19*                 Imaging  No orders to display          Assessment/Plan  * GI bleed- (present on admission)  Assessment & Plan    Melena  H/h increased from 7 to 7.2, no melena overnight  Will continue PPI and octreotide  Will continue serial h/h and will transfuse prbc if hbg less 7   Avoid NSAID product  Mild tachycardia but bp stable, following closely, nursing asked to place two large bore iv  Discussed with GI, they will see  6/20/2023:  Patient with evidence of active bleeding in 3rd-4th portion of duodenum this was treated with 2 clips.  Patient will continue on IV PPI twice daily for total of 72 hours subsequent transition to oral PPI therapy twice daily thereafter for at least 6 to 8 weeks GI recommendations appreciated continue on liquid diet at present.      Thrombocytopenia (HCC)  Assessment & Plan  Mild  following    Class 2 obesity in adult  Assessment & Plan  Diet and lifestyle modification  Body mass index is 35.49 kg/m².      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 review of tests) obtaining and/or reviewing separately obtained history. Performing a medically appropriate examination and/ evaluation.   Counseling and educating the patient/family/caregiver.  Ordering medications, tests, or procedures.  Referring and communicating with other health care professionals.  Documenting clinical information in EPIC.  Independently interpreting results and communicating results to patient/family/caregiver.  Care coordination.    Please note that this dictation was created using voice recognition software. I have made every reasonable attempt to correct obvious errors, but I expect that there are errors of grammar and possibly context that I did not discover before finalizing the note.        VTE prophylaxis: SCDs/TEDs and pharmacologic prophylaxis contraindicated due to bleeding    I have performed a physical exam and reviewed and updated ROS and Plan today (6/21/2023). In review of yesterday's note (6/20/2023), there are no changes except as documented above.

## 2023-06-23 NOTE — DISCHARGE SUMMARY
Discharge Summary    CHIEF COMPLAINT ON ADMISSION  Chief Complaint   Patient presents with    Bloody Stools     X 7-8 days       Reason for Admission  UGIB     Admission Date  6/18/2023    CODE STATUS  Prior    HPI & HOSPITAL COURSE  This is a 83 y.o. female here with melena  Patient is an 83 year old female with multiple comorbidities who presented to [facility] on 6/18/2023 for evaluation of bloody stool. History obtained from patient's son who was at bedside in ER as patient is Croatian-speaking only.  It was reported that patient has been having intermittent dark tarry stool for the past 8 days.     Work-up from [place] ER included:  CBC: WBC 10.3, hemoglobin 5.5, hematocrit 16.5, platelet 191  PT 15.1  INR 1.2  CMP: Sodium 133, potassium 4.1, chloride 101, bicarb 21, calcium 8.9, , creatinine 2.13, glucose 141, total protein 5.9, albumin 3.2, total bilirubin 0.3, alkaline phosphatase 53, AST 20, ALT 13     Patient was given Protonix 80 mg IV and 1 unit PRBC.  Subsequently transferred to [facility] for higher level care and GI evaluation. Repeat hemoglobin is 7.0.  No further bleeding seen since [facility] ER arrival.  Admitted to medicine service for further evaluation and treatment.    Patient underwent endoscopy details of procedure can found below on 6/20/2023.  Patient tolerated procedure well without issue.  As per recommendations per gastroenterology patient had a total of 3 days of IV PPI in the form of Protonix 40 mg twice daily.  Patient was then subsequently transition to omeprazole 40 mg twice daily orally tolerated it well without issue.  Patient has stabilization of hemoglobin no further decrease following procedure.  Patient was discharged with all necessary medications in hand prior to departure.  Patient will follow-up with her primary care provider of record.  Therefore, she is discharged in good and stable condition to home with close outpatient follow-up.    The patient met 2-midnight  criteria for an inpatient stay at the time of discharge.    Discharge Date  6/22/2023    FOLLOW UP ITEMS POST DISCHARGE  All medication as prescribed  Go to all follow-up appointments as indicated  If your symptoms return suddenly worsen go to the nearest emergency department    DISCHARGE DIAGNOSES  Principal Problem:    GI bleed (POA: Yes)  Active Problems:    Hyponatremia (POA: Yes)    Acute blood loss anemia (POA: Unknown)    Acute kidney injury superimposed on CKD (HCC) (POA: Unknown)    Diabetes (HCC) (POA: Unknown)    Hyperlipidemia (POA: Unknown)    Class 2 obesity in adult (POA: Unknown)    Thrombocytopenia (HCC) (POA: Unknown)  Resolved Problems:    * No resolved hospital problems. *      FOLLOW UP    Leonor Landeros M.D.  [id number]  [place] [address]  [phone]    Follow up in 1 week(s)        MEDICATIONS ON DISCHARGE     Medication List        START taking these medications        Instructions   ferrous gluconate 324 (38 Fe) MG Tabs  Start taking on: June 23, 2023  Commonly known as: FERGON   Take 1 Tablet by mouth every morning with breakfast.  Dose: 324 mg     omeprazole 40 MG delayed-release capsule  Commonly known as: PRILOSEC   Take 1 Capsule by mouth 2 times a day for 60 days.  Dose: 40 mg     sucralfate 1 GM Tabs  Commonly known as: CARAFATE   Take 1 Tablet by mouth 4 Times a Day,Before Meals and at Bedtime.  Dose: 1 g            CONTINUE taking these medications        Instructions   allopurinol 300 MG Tabs  Commonly known as: ZYLOPRIM   Take 300 mg by mouth every day.  Dose: 300 mg     atorvastatin 10 MG Tabs  Commonly known as: LIPITOR   Take 10 mg by mouth every evening.  Dose: 10 mg     enalapril 10 MG Tabs  Commonly known as: VASOTEC   Take 10 mg by mouth every day. Indications: High Blood Pressure  Dose: 10 mg     Janumet  MG per tablet  Generic drug: sitagliptan-metformin   Take 1 Tablet by mouth every morning with breakfast.  Dose: 1 Tablet     Prenatal Vitamin  27-0.8 MG Tabs   Take 1 Tablet by mouth every day.  Dose: 1 Tablet            STOP taking these medications      aspirin 81 MG EC tablet     PARoxetine 10 MG Tabs  Commonly known as: PAXIL              Allergies  Allergies   Allergen Reactions    Pcn [Penicillins] Swelling     THROAT SWELLS    > 70 years ago   In [place]       DIET  No orders of the defined types were placed in this encounter.      ACTIVITY  As tolerated.  Weight bearing as tolerated    CONSULTATIONS  Gastroenterology    PROCEDURES  6/20/2023:  OPERATIVE FINDINGS:     1. Esophagus: GERD grade A.   2. Stomach: Grossly normal. Some old blood in the antrum, removed.   3. Duodenum: Old and fresh blood in duodenum, all removed. Scars in the duodenum. One diverticula in 2nd portion. One active bleeding source was found with rapid oozing at 3-4 portion of duodenum. No ulcer base. Treated with two clips. Bleeding stop.   4. Jejunum was check for 10cm, no bleeding.      IMPRESSION/RECOMMENDATIONS:  IV PPI for total three day. Then oral PPI.     LABORATORY  Lab Results   Component Value Date    SODIUM 139 06/21/2023    POTASSIUM 3.7 06/21/2023    CHLORIDE 106 06/21/2023    CO2 21 06/21/2023    GLUCOSE 129 (H) 06/21/2023    BUN 52 (H) 06/21/2023    CREATININE 1.27 06/21/2023        Lab Results   Component Value Date    WBC 9.4 06/21/2023    HEMOGLOBIN 7.9 (L) 06/21/2023    HEMATOCRIT 24.6 (L) 06/21/2023    PLATELETCT 175 06/21/2023      Please note that this dictation was created using voice recognition software. I have made every reasonable attempt to correct obvious errors, but I expect that there are errors of grammar and possibly context that I did not discover before finalizing the note.    Total time of the discharge process exceeds 35 minutes.

## 2023-06-23 NOTE — HOSPITAL COURSE
Patient is an 83 year old female with multiple comorbidities who presented to [facility] on 6/18/2023 for evaluation of bloody stool. History obtained from patient's son who was at bedside in ER as patient is Azeri-speaking only.  It was reported that patient has been having intermittent dark tarry stool for the past 8 days.     Work-up from [place] ER included:  CBC: WBC 10.3, hemoglobin 5.5, hematocrit 16.5, platelet 191  PT 15.1  INR 1.2  CMP: Sodium 133, potassium 4.1, chloride 101, bicarb 21, calcium 8.9, , creatinine 2.13, glucose 141, total protein 5.9, albumin 3.2, total bilirubin 0.3, alkaline phosphatase 53, AST 20, ALT 13     Patient was given Protonix 80 mg IV and 1 unit PRBC.  Subsequently transferred to [facility] for higher level care and GI evaluation. Repeat hemoglobin is 7.0.  No further bleeding seen since [facility] ER arrival.  Admitted to medicine service for further evaluation and treatment.
